# Patient Record
Sex: FEMALE | Race: WHITE | NOT HISPANIC OR LATINO
[De-identification: names, ages, dates, MRNs, and addresses within clinical notes are randomized per-mention and may not be internally consistent; named-entity substitution may affect disease eponyms.]

---

## 2017-02-27 ENCOUNTER — APPOINTMENT (OUTPATIENT)
Dept: ELECTROPHYSIOLOGY | Facility: CLINIC | Age: 82
End: 2017-02-27

## 2017-05-30 ENCOUNTER — APPOINTMENT (OUTPATIENT)
Dept: ELECTROPHYSIOLOGY | Facility: CLINIC | Age: 82
End: 2017-05-30

## 2017-09-12 ENCOUNTER — APPOINTMENT (OUTPATIENT)
Dept: ELECTROPHYSIOLOGY | Facility: CLINIC | Age: 82
End: 2017-09-12

## 2017-12-14 ENCOUNTER — APPOINTMENT (OUTPATIENT)
Dept: ELECTROPHYSIOLOGY | Facility: CLINIC | Age: 82
End: 2017-12-14
Payer: MEDICARE

## 2017-12-14 PROCEDURE — 93280 PM DEVICE PROGR EVAL DUAL: CPT

## 2018-03-19 ENCOUNTER — APPOINTMENT (OUTPATIENT)
Dept: ELECTROPHYSIOLOGY | Facility: CLINIC | Age: 83
End: 2018-03-19

## 2018-07-05 ENCOUNTER — APPOINTMENT (OUTPATIENT)
Dept: ELECTROPHYSIOLOGY | Facility: CLINIC | Age: 83
End: 2018-07-05
Payer: MEDICARE

## 2018-07-05 PROCEDURE — 93280 PM DEVICE PROGR EVAL DUAL: CPT

## 2018-10-16 ENCOUNTER — APPOINTMENT (OUTPATIENT)
Dept: ELECTROPHYSIOLOGY | Facility: CLINIC | Age: 83
End: 2018-10-16

## 2019-01-17 ENCOUNTER — APPOINTMENT (OUTPATIENT)
Dept: ELECTROPHYSIOLOGY | Facility: CLINIC | Age: 84
End: 2019-01-17
Payer: MEDICARE

## 2019-01-17 PROCEDURE — 93280 PM DEVICE PROGR EVAL DUAL: CPT

## 2019-01-17 RX ORDER — NIFEDIPINE 30 MG/1
30 TABLET, FILM COATED, EXTENDED RELEASE ORAL DAILY
Refills: 0 | Status: ACTIVE | COMMUNITY

## 2019-06-17 ENCOUNTER — APPOINTMENT (OUTPATIENT)
Dept: ELECTROPHYSIOLOGY | Facility: CLINIC | Age: 84
End: 2019-06-17
Payer: MEDICARE

## 2019-06-17 VITALS — SYSTOLIC BLOOD PRESSURE: 119 MMHG | HEART RATE: 70 BPM | DIASTOLIC BLOOD PRESSURE: 77 MMHG | RESPIRATION RATE: 14 BRPM

## 2019-06-17 PROCEDURE — 93280 PM DEVICE PROGR EVAL DUAL: CPT

## 2019-09-16 ENCOUNTER — APPOINTMENT (OUTPATIENT)
Dept: ELECTROPHYSIOLOGY | Facility: CLINIC | Age: 84
End: 2019-09-16
Payer: MEDICARE

## 2019-09-16 VITALS — SYSTOLIC BLOOD PRESSURE: 99 MMHG | DIASTOLIC BLOOD PRESSURE: 68 MMHG | HEART RATE: 60 BPM | RESPIRATION RATE: 14 BRPM

## 2019-09-16 PROCEDURE — 93280 PM DEVICE PROGR EVAL DUAL: CPT

## 2019-11-18 ENCOUNTER — APPOINTMENT (OUTPATIENT)
Dept: ELECTROPHYSIOLOGY | Facility: CLINIC | Age: 84
End: 2019-11-18
Payer: MEDICARE

## 2019-11-18 PROCEDURE — 93294 REM INTERROG EVL PM/LDLS PM: CPT

## 2019-11-18 PROCEDURE — 93296 REM INTERROG EVL PM/IDS: CPT

## 2020-01-27 ENCOUNTER — APPOINTMENT (OUTPATIENT)
Dept: ELECTROPHYSIOLOGY | Facility: CLINIC | Age: 85
End: 2020-01-27
Payer: MEDICARE

## 2020-01-27 PROCEDURE — 93280 PM DEVICE PROGR EVAL DUAL: CPT

## 2020-04-27 ENCOUNTER — APPOINTMENT (OUTPATIENT)
Dept: ELECTROPHYSIOLOGY | Facility: CLINIC | Age: 85
End: 2020-04-27

## 2020-04-30 ENCOUNTER — APPOINTMENT (OUTPATIENT)
Dept: ELECTROPHYSIOLOGY | Facility: CLINIC | Age: 85
End: 2020-04-30
Payer: MEDICARE

## 2020-04-30 PROCEDURE — 93294 REM INTERROG EVL PM/LDLS PM: CPT

## 2020-04-30 PROCEDURE — 93296 REM INTERROG EVL PM/IDS: CPT

## 2020-07-31 ENCOUNTER — APPOINTMENT (OUTPATIENT)
Dept: ELECTROPHYSIOLOGY | Facility: CLINIC | Age: 85
End: 2020-07-31
Payer: MEDICARE

## 2020-07-31 PROCEDURE — 93294 REM INTERROG EVL PM/LDLS PM: CPT

## 2020-07-31 PROCEDURE — 93296 REM INTERROG EVL PM/IDS: CPT

## 2020-09-30 ENCOUNTER — APPOINTMENT (OUTPATIENT)
Dept: ELECTROPHYSIOLOGY | Facility: CLINIC | Age: 85
End: 2020-09-30
Payer: MEDICARE

## 2020-09-30 VITALS — SYSTOLIC BLOOD PRESSURE: 114 MMHG | DIASTOLIC BLOOD PRESSURE: 68 MMHG

## 2020-09-30 DIAGNOSIS — I44.2 ATRIOVENTRICULAR BLOCK, COMPLETE: ICD-10-CM

## 2020-09-30 PROCEDURE — 93280 PM DEVICE PROGR EVAL DUAL: CPT

## 2020-11-19 LAB
INR PPP: 1.5
PROTHROMBIN TIME COMMENT: NORMAL

## 2020-12-22 DIAGNOSIS — Z20.828 CONTACT WITH AND (SUSPECTED) EXPOSURE TO OTHER VIRAL COMMUNICABLE DISEASES: ICD-10-CM

## 2020-12-28 LAB
INR PPP: 3.92
PROTHROMBIN TIME COMMENT: NORMAL
PT BLD: 43.1

## 2020-12-30 ENCOUNTER — APPOINTMENT (OUTPATIENT)
Dept: ELECTROPHYSIOLOGY | Facility: CLINIC | Age: 85
End: 2020-12-30
Payer: MEDICARE

## 2020-12-30 PROCEDURE — 93296 REM INTERROG EVL PM/IDS: CPT

## 2020-12-30 PROCEDURE — 93294 REM INTERROG EVL PM/LDLS PM: CPT

## 2021-01-01 ENCOUNTER — APPOINTMENT (OUTPATIENT)
Dept: ELECTROPHYSIOLOGY | Facility: CLINIC | Age: 86
End: 2021-01-01
Payer: MEDICARE

## 2021-01-01 ENCOUNTER — NON-APPOINTMENT (OUTPATIENT)
Age: 86
End: 2021-01-01

## 2021-01-01 LAB
INR PPP: 1.75
INR PPP: 2.72
INR PPP: 3.08
INR PPP: 3.41
INR PPP: 9.48
PROTHROMBIN TIME COMMENT: NORMAL
PT BLD: 101.8
PT BLD: 20.1
PT BLD: 30.6
PT BLD: 34.4
PT BLD: 38

## 2021-01-01 PROCEDURE — 93296 REM INTERROG EVL PM/IDS: CPT

## 2021-01-01 PROCEDURE — 93294 REM INTERROG EVL PM/LDLS PM: CPT | Mod: NC

## 2021-01-12 LAB
INR PPP: 3.59
PROTHROMBIN TIME COMMENT: NORMAL
PT BLD: 41.1

## 2021-01-14 LAB
INR PPP: 2.91
INR PPP: 3.03
PROTHROMBIN TIME COMMENT: NORMAL
PROTHROMBIN TIME COMMENT: NORMAL
PT BLD: 32.2
PT BLD: 34.6

## 2021-01-22 ENCOUNTER — APPOINTMENT (OUTPATIENT)
Dept: ELECTROPHYSIOLOGY | Facility: CLINIC | Age: 86
End: 2021-01-22
Payer: MEDICARE

## 2021-01-22 ENCOUNTER — NON-APPOINTMENT (OUTPATIENT)
Age: 86
End: 2021-01-22

## 2021-01-22 VITALS
HEART RATE: 66 BPM | OXYGEN SATURATION: 98 % | SYSTOLIC BLOOD PRESSURE: 157 MMHG | DIASTOLIC BLOOD PRESSURE: 92 MMHG | BODY MASS INDEX: 22.82 KG/M2 | HEIGHT: 59 IN

## 2021-01-22 VITALS — WEIGHT: 113 LBS | BODY MASS INDEX: 22.82 KG/M2

## 2021-01-22 VITALS — TEMPERATURE: 96.4 F

## 2021-01-22 LAB
INR PPP: 2.93
PROTHROMBIN TIME COMMENT: NORMAL
PT BLD: 33.5

## 2021-01-22 PROCEDURE — 99215 OFFICE O/P EST HI 40 MIN: CPT

## 2021-01-22 PROCEDURE — 99072 ADDL SUPL MATRL&STAF TM PHE: CPT

## 2021-01-22 PROCEDURE — 93000 ELECTROCARDIOGRAM COMPLETE: CPT

## 2021-01-22 NOTE — PHYSICAL EXAM
[General Appearance - Well Developed] : well developed [Normal Appearance] : normal appearance [Well Groomed] : well groomed [General Appearance - Well Nourished] : well nourished [No Deformities] : no deformities [General Appearance - In No Acute Distress] : no acute distress [Normal Conjunctiva] : the conjunctiva exhibited no abnormalities [Eyelids - No Xanthelasma] : the eyelids demonstrated no xanthelasmas [Normal Oral Mucosa] : normal oral mucosa [No Oral Pallor] : no oral pallor [No Oral Cyanosis] : no oral cyanosis [Normal Jugular Venous A Waves Present] : normal jugular venous A waves present [Normal Jugular Venous V Waves Present] : normal jugular venous V waves present [No Jugular Venous Van A Waves] : no jugular venous van A waves [Heart Rate And Rhythm] : heart rate and rhythm were normal [Heart Sounds] : normal S1 and S2 [Murmurs] : no murmurs present [Respiration, Rhythm And Depth] : normal respiratory rhythm and effort [Exaggerated Use Of Accessory Muscles For Inspiration] : no accessory muscle use [Auscultation Breath Sounds / Voice Sounds] : lungs were clear to auscultation bilaterally [Abdomen Soft] : soft [Abdomen Tenderness] : non-tender [Abdomen Mass (___ Cm)] : no abdominal mass palpated [Abnormal Walk] : normal gait [Gait - Sufficient For Exercise Testing] : the gait was sufficient for exercise testing [Nail Clubbing] : no clubbing of the fingernails [Cyanosis, Localized] : no localized cyanosis [Petechial Hemorrhages (___cm)] : no petechial hemorrhages [Skin Color & Pigmentation] : normal skin color and pigmentation [] : no rash [No Venous Stasis] : no venous stasis [Skin Lesions] : no skin lesions [No Skin Ulcers] : no skin ulcer [No Xanthoma] : no  xanthoma was observed [Oriented To Time, Place, And Person] : oriented to person, place, and time [Affect] : the affect was normal [Mood] : the mood was normal [No Anxiety] : not feeling anxious

## 2021-01-22 NOTE — HISTORY OF PRESENT ILLNESS
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 Admits doing well with no issues or complaints. Denies chest pain, palpitations, SOB, syncope or near syncope. KELSEY reached as of 12/28/2020.

## 2021-01-22 NOTE — DISCUSSION/SUMMARY
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 \par \par Impression:\par \par 1. Complete AV block: s/p dual chamber PPM. Now at KELSEY. Recommend undergoing routine PPM gen change. Risks, benefits, and alternatives discussed. Of note, ECHO in 2014 with LVEF 41%. Will get more recent ECHO. Feels well, some dyspnea. Risk of upgrade may outweigh benefit at this time. \par \par 2. Paroxysmal afib: resume warfarin for thromboembolic prophylaxis. \par \par 3. HTN: resume oral antihypertensives as prescribed. Encouraged heart healthy diet, sodium restriction, and weight loss. Continue regular f/u with Cardiologist for further HTN management.\par \par 3. HLD: resume statin therapy as prescribed and regular f/u with Cardiologist for routine lipid monitoring and management.\par \par Sincerely,\par \par Marcellus Donovan MD

## 2021-01-28 LAB
INR PPP: 3.39
PROTHROMBIN TIME COMMENT: NORMAL
PT BLD: 38.8

## 2021-02-05 LAB
INR PPP: 1.99
PROTHROMBIN TIME COMMENT: NORMAL
PT BLD: 22.6

## 2021-02-11 LAB
INR PPP: 2.06
PROTHROMBIN TIME COMMENT: NORMAL

## 2021-02-16 ENCOUNTER — OUTPATIENT (OUTPATIENT)
Dept: OUTPATIENT SERVICES | Facility: HOSPITAL | Age: 86
LOS: 1 days | End: 2021-02-16

## 2021-02-16 VITALS
RESPIRATION RATE: 16 BRPM | TEMPERATURE: 97 F | HEIGHT: 61 IN | DIASTOLIC BLOOD PRESSURE: 72 MMHG | SYSTOLIC BLOOD PRESSURE: 120 MMHG | HEART RATE: 67 BPM | WEIGHT: 117.07 LBS | OXYGEN SATURATION: 98 %

## 2021-02-16 DIAGNOSIS — R52 PAIN, UNSPECIFIED: ICD-10-CM

## 2021-02-16 DIAGNOSIS — I44.2 ATRIOVENTRICULAR BLOCK, COMPLETE: ICD-10-CM

## 2021-02-16 DIAGNOSIS — Z98.890 OTHER SPECIFIED POSTPROCEDURAL STATES: Chronic | ICD-10-CM

## 2021-02-16 LAB
ALBUMIN SERPL ELPH-MCNC: 4.3 G/DL — SIGNIFICANT CHANGE UP (ref 3.3–5)
ALP SERPL-CCNC: 89 U/L — SIGNIFICANT CHANGE UP (ref 40–120)
ALT FLD-CCNC: 15 U/L — SIGNIFICANT CHANGE UP (ref 4–33)
ANION GAP SERPL CALC-SCNC: 12 MMOL/L — SIGNIFICANT CHANGE UP (ref 7–14)
APTT BLD: 43.2 SEC — HIGH (ref 27–36.3)
AST SERPL-CCNC: 22 U/L — SIGNIFICANT CHANGE UP (ref 4–32)
BILIRUB SERPL-MCNC: 0.7 MG/DL — SIGNIFICANT CHANGE UP (ref 0.2–1.2)
BUN SERPL-MCNC: 40 MG/DL — HIGH (ref 7–23)
CALCIUM SERPL-MCNC: 9.8 MG/DL — SIGNIFICANT CHANGE UP (ref 8.4–10.5)
CHLORIDE SERPL-SCNC: 100 MMOL/L — SIGNIFICANT CHANGE UP (ref 98–107)
CO2 SERPL-SCNC: 23 MMOL/L — SIGNIFICANT CHANGE UP (ref 22–31)
CREAT SERPL-MCNC: 1.51 MG/DL — HIGH (ref 0.5–1.3)
GLUCOSE SERPL-MCNC: 75 MG/DL — SIGNIFICANT CHANGE UP (ref 70–99)
HCT VFR BLD CALC: 37.1 % — SIGNIFICANT CHANGE UP (ref 34.5–45)
HGB BLD-MCNC: 11.6 G/DL — SIGNIFICANT CHANGE UP (ref 11.5–15.5)
INR BLD: 2.63 RATIO — HIGH (ref 0.88–1.16)
MCHC RBC-ENTMCNC: 31.3 GM/DL — LOW (ref 32–36)
MCHC RBC-ENTMCNC: 32.1 PG — SIGNIFICANT CHANGE UP (ref 27–34)
MCV RBC AUTO: 102.8 FL — HIGH (ref 80–100)
NRBC # BLD: 0 /100 WBCS — SIGNIFICANT CHANGE UP
NRBC # FLD: 0 K/UL — SIGNIFICANT CHANGE UP
PLATELET # BLD AUTO: 218 K/UL — SIGNIFICANT CHANGE UP (ref 150–400)
POTASSIUM SERPL-MCNC: 4.1 MMOL/L — SIGNIFICANT CHANGE UP (ref 3.5–5.3)
POTASSIUM SERPL-SCNC: 4.1 MMOL/L — SIGNIFICANT CHANGE UP (ref 3.5–5.3)
PROT SERPL-MCNC: 7.6 G/DL — SIGNIFICANT CHANGE UP (ref 6–8.3)
PROTHROM AB SERPL-ACNC: 28.6 SEC — HIGH (ref 10.6–13.6)
RBC # BLD: 3.61 M/UL — LOW (ref 3.8–5.2)
RBC # FLD: 13.4 % — SIGNIFICANT CHANGE UP (ref 10.3–14.5)
SODIUM SERPL-SCNC: 135 MMOL/L — SIGNIFICANT CHANGE UP (ref 135–145)
WBC # BLD: 7.09 K/UL — SIGNIFICANT CHANGE UP (ref 3.8–10.5)
WBC # FLD AUTO: 7.09 K/UL — SIGNIFICANT CHANGE UP (ref 3.8–10.5)

## 2021-02-16 NOTE — H&P PST ADULT - CVS HE PE MLT D E PC
note: has h/o atrial fibrillation; S 1 S 2/regular rate and rhythm/no rub/no murmur note: has h/o atrial fibrillation; S 1 S 2/regular rate and rhythm/no rub

## 2021-02-16 NOTE — H&P PST ADULT - NSANTHOSAYNRD_GEN_A_CORE
No. MANJINDER screening performed.  STOP BANG Legend: 0-2 = LOW Risk; 3-4 = INTERMEDIATE Risk; 5-8 = HIGH Risk

## 2021-02-16 NOTE — H&P PST ADULT - NSICDXPASTSURGICALHX_GEN_ALL_CORE_FT
PAST SURGICAL HISTORY:  Artificial pacemaker to be changed on 2-24-21, medtronic [id number] Adapta    S/P ovarian cystectomy h/o  bilateral  oophorectomy

## 2021-02-16 NOTE — H&P PST ADULT - NSICDXPASTMEDICALHX_GEN_ALL_CORE_FT
PAST MEDICAL HISTORY:  Atrial fibrillation     Atrial fibrillation on coumadin    H/O CHF     H/O hyperlipidemia     H/O: hypertension     Hearing loss bilateral, wears hearing aids bilaterall    History of complete AV block has pacemaker medtronic [id number] Adapta -- to be changed on 2-24-21     PAST MEDICAL HISTORY:  Aortic stenosis     Atrial fibrillation on coumadin    H/O CHF     H/O hyperlipidemia     H/O: hypertension     Hearing loss bilateral, wears hearing aids bilaterall    History of complete AV block has pacemaker medtronic [id number] Adapta -- to be changed on 2-24-21    Murmur

## 2021-02-16 NOTE — H&P PST ADULT - NSICDXPROBLEM_GEN_ALL_CORE_FT
PROBLEM DIAGNOSES  Problem: Pain  Assessment and Plan: This is a 89 y/o female who is scheduled for permanent pacemaker generator change on 2-24-21  * Scheduled for covid testing  * Given preop and cleasner instructions with good teach back and patient verbalized understanding  * Copy of ekg and pacemaker interrogation in chart  * Instructed by MD not to take coumadin the evening before or morning of surgery  * Instructed to take normal am dose of    the am of surgery       PROBLEM DIAGNOSES  Problem: Pain  Assessment and Plan: This is a 89 y/o female who is scheduled for permanent pacemaker generator change on 2-24-21  * Scheduled for covid testing  * Given preop instructions -- not given cleanser because she did not relay back to NP on how to use cleanser  * Copy of ekg and pacemaker interrogation in chart  * Instructed by MD not to take coumadin the evening before or morning of surgery  * Instructed to take normal am dose of    the am of surgery       PROBLEM DIAGNOSES  Problem: Pain  Assessment and Plan: This is a 89 y/o female who is scheduled for permanent pacemaker generator change on 2-24-21  * Scheduled for covid testing  * Given preop instructions -- not given cleanser because she did not relay back to NP on how to use cleanser  * Copy of ekg and pacemaker interrogation in chart  * Instructed by MD not to take coumadin the evening before or morning of surgery  * Instructed to take normal am dose of  nifedipine and metoprolol  the am of surgery       PROBLEM DIAGNOSES  Problem: Pain  Assessment and Plan: This is a 89 y/o female who is scheduled for permanent pacemaker generator change on 2-24-21  * Scheduled for covid testing  * Given preop instructions -- not given cleanser because she did not relay back to NP on how to use cleanser  * Copy of ekg and pacemaker interrogation in chart  * Await stress test and echo from Dr. DIANNE Mujica  * Instructed by MD not to take coumadin the evening before or morning of surgery  * Instructed to take normal am dose of  nifedipine and metoprolol  the am of surgery  * Notified OR booking via fax of PPM

## 2021-02-16 NOTE — H&P PST ADULT - HISTORY OF PRESENT ILLNESS
This is a 89 y/o female who presents with dual chamber PPM which has reached KELSEY. Asymptomatic. Scheduled for permanent pacemaker generator change This is a 89 y/o female who presents with dual chamber PPM which has reached KELSEY (elective replacement indication). Asymptomatic. Scheduled for permanent pacemaker generator change

## 2021-02-17 PROBLEM — Z86.79 PERSONAL HISTORY OF OTHER DISEASES OF THE CIRCULATORY SYSTEM: Chronic | Status: ACTIVE | Noted: 2021-02-16

## 2021-02-17 PROBLEM — H91.90 UNSPECIFIED HEARING LOSS, UNSPECIFIED EAR: Chronic | Status: ACTIVE | Noted: 2021-02-16

## 2021-02-17 PROBLEM — I48.91 UNSPECIFIED ATRIAL FIBRILLATION: Chronic | Status: ACTIVE | Noted: 2021-02-16

## 2021-02-17 PROBLEM — I35.0 NONRHEUMATIC AORTIC (VALVE) STENOSIS: Chronic | Status: ACTIVE | Noted: 2021-02-16

## 2021-02-17 PROBLEM — R01.1 CARDIAC MURMUR, UNSPECIFIED: Chronic | Status: ACTIVE | Noted: 2021-02-16

## 2021-02-19 LAB
INR PPP: 2.71
PROTHROMBIN TIME COMMENT: NORMAL

## 2021-02-20 DIAGNOSIS — Z01.818 ENCOUNTER FOR OTHER PREPROCEDURAL EXAMINATION: ICD-10-CM

## 2021-02-21 ENCOUNTER — APPOINTMENT (OUTPATIENT)
Dept: DISASTER EMERGENCY | Facility: CLINIC | Age: 86
End: 2021-02-21

## 2021-02-22 LAB — SARS-COV-2 N GENE NPH QL NAA+PROBE: NOT DETECTED

## 2021-02-24 ENCOUNTER — OUTPATIENT (OUTPATIENT)
Dept: OUTPATIENT SERVICES | Facility: HOSPITAL | Age: 86
LOS: 1 days | Discharge: HOME | End: 2021-02-24
Payer: MEDICARE

## 2021-02-24 DIAGNOSIS — I44.2 ATRIOVENTRICULAR BLOCK, COMPLETE: ICD-10-CM

## 2021-02-24 DIAGNOSIS — Z98.890 OTHER SPECIFIED POSTPROCEDURAL STATES: Chronic | ICD-10-CM

## 2021-02-24 LAB
INR BLD: 3.04 RATIO — HIGH (ref 0.88–1.16)
PROTHROM AB SERPL-ACNC: 32.9 SEC — HIGH (ref 10.6–13.6)

## 2021-02-24 PROCEDURE — 33263 RMVL & RPLCMT DFB GEN 2 LEAD: CPT

## 2021-02-24 PROCEDURE — 93010 ELECTROCARDIOGRAM REPORT: CPT

## 2021-02-24 PROCEDURE — 93010 ELECTROCARDIOGRAM REPORT: CPT | Mod: 77

## 2021-02-24 RX ORDER — ERGOCALCIFEROL 1.25 MG/1
1 CAPSULE ORAL
Qty: 0 | Refills: 0 | DISCHARGE

## 2021-02-24 RX ORDER — WARFARIN SODIUM 2.5 MG/1
0 TABLET ORAL
Qty: 0 | Refills: 0 | DISCHARGE

## 2021-02-24 RX ORDER — SODIUM CHLORIDE 9 MG/ML
3 INJECTION INTRAMUSCULAR; INTRAVENOUS; SUBCUTANEOUS EVERY 8 HOURS
Refills: 0 | Status: DISCONTINUED | OUTPATIENT
Start: 2021-02-24 | End: 2021-03-11

## 2021-02-24 RX ORDER — WARFARIN SODIUM 2.5 MG/1
1 TABLET ORAL
Qty: 0 | Refills: 0 | DISCHARGE

## 2021-02-24 NOTE — CHART NOTE - NSCHARTNOTEFT_GEN_A_CORE
Type of Procedure: pacemaker generator change (dual chamber)  Licensed independent practitioner: Marcellus Donovan MD  Assistant: none  Description of procedure: sterile conditions, antibiotic coverage, old pacemaker removed, pocket copiously irrigated with antibiotic solution, new pacemaker implanted and pocket closed in 3 layers. A representative was present to help operate a sophisticated .   Findings of procedure: normal pacing, sensing and lead integrity  Estimated blood loss: < 10 cc  Specimen removed: old battery depleted pacemaker  Preoperative Dx: complete heart block; pacemaker at EOL  Postoperative Dx: complete heart block; pacemaker at EOL; pacemaker replaced  Complications: none  Anesthesia type: local anesthesia with sedation  No heparin for 24 hours and then reassess.    Marcellus Donovan MD.

## 2021-02-24 NOTE — CHART NOTE - NSCHARTNOTEFT_GEN_A_CORE
Patient is s/p PPM generator change. Device site clean, dry and intact with no bleeding or hematoma. Device teaching given to patient and she demonstrates understanding of the instructions. All questions answered. F/U appointment with device clinic on 3/12/21 @ 10:15 am

## 2021-02-24 NOTE — CHART NOTE - NSCHARTNOTEFT_GEN_A_CORE
In brief this is a 89 y/o F with PMH of PAF(on Coumadin and last dose was 2 days ago), HTN, HLD, Complete AVB s/p PPM implant presented to [facility] for PPM generator change as it is KELSEY. Patient stated that she has been in her usual state of health since her last visit from Dr. Donovan's office. Explained in detail about the procedure, all risks and benefits explained and patient understood and signed the consents for the procedure. Medication list reviewed with patient and updated, STAT INR ordered and [employer] H&P and Pre-surgical H&P noted. All questions answered to patient's satisfaction. EKG revealed Electronic V-paced rhythm at a rate of 65 with QTc of 480.     COVID PCR not detected on 02/21/2021

## 2021-02-24 NOTE — CHART NOTE - NSCHARTNOTEFT_GEN_A_CORE
Spoke with Dr. Mujica patient's primary cardiologist regarding Coumadin dosing post PPM generator change. As per Dr. Mujica can give Coumadin 2.5mg for two consecutive days followed by 5mg the 3rd day. Educated patient that she must take 2.5mg for two consecutive days and then take 5mg the 3rd day. Spoke with Dr. Mujica patient's primary cardiologist regarding Coumadin dosing post PPM generator change. As per Dr. Mujica can give Coumadin 2.5mg for two consecutive days followed by 5mg the 3rd day and then repeat this process. Educated patient that she must take 2.5mg for two consecutive days and then take 5mg the 3rd day. Instructed patient that she must follow up with Dr. Mujica either end of this week or earlier next week for INR checks.

## 2021-03-01 LAB
INR PPP: 2.63
PROTHROMBIN TIME COMMENT: NORMAL

## 2021-03-02 ENCOUNTER — RX RENEWAL (OUTPATIENT)
Age: 86
End: 2021-03-02

## 2021-03-02 DIAGNOSIS — Z79.01 LONG TERM (CURRENT) USE OF ANTICOAGULANTS: ICD-10-CM

## 2021-03-02 DIAGNOSIS — I10 ESSENTIAL (PRIMARY) HYPERTENSION: ICD-10-CM

## 2021-03-02 DIAGNOSIS — I35.0 NONRHEUMATIC AORTIC (VALVE) STENOSIS: ICD-10-CM

## 2021-03-12 ENCOUNTER — APPOINTMENT (OUTPATIENT)
Dept: ELECTROPHYSIOLOGY | Facility: CLINIC | Age: 86
End: 2021-03-12
Payer: MEDICARE

## 2021-03-12 VITALS — DIASTOLIC BLOOD PRESSURE: 74 MMHG | SYSTOLIC BLOOD PRESSURE: 120 MMHG

## 2021-03-12 PROCEDURE — 99072 ADDL SUPL MATRL&STAF TM PHE: CPT

## 2021-03-12 PROCEDURE — 93280 PM DEVICE PROGR EVAL DUAL: CPT

## 2021-03-18 ENCOUNTER — APPOINTMENT (OUTPATIENT)
Dept: CARDIOLOGY | Facility: CLINIC | Age: 86
End: 2021-03-18
Payer: MEDICARE

## 2021-03-18 VITALS
OXYGEN SATURATION: 96 % | DIASTOLIC BLOOD PRESSURE: 70 MMHG | WEIGHT: 107 LBS | BODY MASS INDEX: 21.61 KG/M2 | SYSTOLIC BLOOD PRESSURE: 112 MMHG | HEART RATE: 80 BPM

## 2021-03-18 DIAGNOSIS — I50.9 HEART FAILURE, UNSPECIFIED: ICD-10-CM

## 2021-03-18 DIAGNOSIS — E78.5 HYPERLIPIDEMIA, UNSPECIFIED: ICD-10-CM

## 2021-03-18 DIAGNOSIS — Z79.01 ENCOUNTER FOR THERAPEUTIC DRUG LVL MONITORING: ICD-10-CM

## 2021-03-18 DIAGNOSIS — Z51.81 ENCOUNTER FOR THERAPEUTIC DRUG LVL MONITORING: ICD-10-CM

## 2021-03-18 DIAGNOSIS — I44.2 ATRIOVENTRICULAR BLOCK, COMPLETE: ICD-10-CM

## 2021-03-18 DIAGNOSIS — Z95.0 PRESENCE OF CARDIAC PACEMAKER: ICD-10-CM

## 2021-03-18 DIAGNOSIS — I35.0 NONRHEUMATIC AORTIC (VALVE) STENOSIS: ICD-10-CM

## 2021-03-18 DIAGNOSIS — I48.0 PAROXYSMAL ATRIAL FIBRILLATION: ICD-10-CM

## 2021-03-18 PROCEDURE — 99072 ADDL SUPL MATRL&STAF TM PHE: CPT

## 2021-03-18 PROCEDURE — 99204 OFFICE O/P NEW MOD 45 MIN: CPT

## 2021-03-18 NOTE — HISTORY OF PRESENT ILLNESS
[FreeTextEntry1] : Zarina Martines is an amazing 89-year-old woman who I have seen in the past and who has had several cardiac catheterizations\par \par She is 89 years old with a history of hypertension, chronic atrial fibrillation, mixed hyperlipidemia, status post pacemaker for AV block many years ago, status post cardiac catheterization on at least 2 occasions many years ago with normal coronary arteries and moderate aortic stenosis.  Subsequent echocardiogram has revealed significant progression of the aortic stenosis but LV function apparently has been normal.  She has not had a repeat noninvasive work-up\par \par Overall she has been stable with mild exertional shortness of breath.  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  There is no exertional chest pressure no exertional dizziness but she herself realizes that she is slowing down with shortness of breath.  There is no edema orthopnea PND\par \par Her INRs have been in range between 2 and 2.5 sometimes a little bit higher and now is followed once a month with home draws.  She is on Coumadin 2.5, 2.5, 5, Nifedical and metoprolol 25 twice daily\par \par She denies headache palpitations dizziness or syncope

## 2021-03-18 NOTE — PHYSICAL EXAM
[Normal Appearance] : normal appearance [General Appearance - Well Nourished] : well nourished [Normal Conjunctiva] : the conjunctiva exhibited no abnormalities [No Jugular Venous Van A Waves] : no jugular venous van A waves [Heart Sounds] : normal S1 and S2 [Auscultation Breath Sounds / Voice Sounds] : lungs were clear to auscultation bilaterally [Abdomen Soft] : soft [Abdomen Tenderness] : non-tender [Cyanosis, Localized] : no localized cyanosis [FreeTextEntry1] : No edema

## 2021-03-18 NOTE — ASSESSMENT
[FreeTextEntry1] : Zarina Martines an amazing 89-year-old with known aortic stenosis moderate to severe to severe, chronic atrial fibrillation on anticoagulation, status post pacemaker for AV block presently paced 100% of the time with underlying atrial fibrillation and recently some progressive exertional fatigue and shortness of breath.  She is quite active and when she is overly active the symptoms have now progressed.  She presently is 89 years old and fairly satisfied with her lifestyle and realizes that she is slowing down.\par \par In the past we have had discussions about a more aggressive approach with transaortic valve replacement which she has declined.  She is 89 years old and still functioning at a good level

## 2021-03-18 NOTE — DISCUSSION/SUMMARY
[FreeTextEntry1] : The patient is amazingly stable and for now I advised her to try to decrease the amount of activity that she does in terms of household work and to try to get more help in her house.  She will be moved to an assisted living in the next few months\par \par She should continue on her present regimen of medications\par INR will be drawn once a month and we will inform her as to how to change the dose of the Coumadin\par \par I had a long discussion with the son and told him to be proactive in terms of getting her help in the assisted living facility.  She is quite independent and mentally quite sharp\par \par Follow-up with me again in 3 to 4 months

## 2021-03-18 NOTE — REASON FOR VISIT
[FreeTextEntry1] : Zarina Jvaiersmason 89 years old previously seen by me in the distant past.  Patient has known aortic stenosis, chronic atrial fibrillation, status post pacemaker

## 2021-03-31 ENCOUNTER — APPOINTMENT (OUTPATIENT)
Dept: ELECTROPHYSIOLOGY | Facility: CLINIC | Age: 86
End: 2021-03-31

## 2021-04-22 LAB
INR PPP: 3.1
PROTHROMBIN TIME COMMENT: NORMAL
PT BLD: 35.7

## 2021-05-21 LAB
INR PPP: 1.07
PROTHROMBIN TIME COMMENT: NORMAL
PT BLD: 12.3

## 2021-06-03 ENCOUNTER — RX RENEWAL (OUTPATIENT)
Age: 86
End: 2021-06-03

## 2021-06-04 LAB
INR PPP: 1.1
PROTHROMBIN TIME COMMENT: NORMAL
PT BLD: 12.7

## 2021-06-14 ENCOUNTER — NON-APPOINTMENT (OUTPATIENT)
Age: 86
End: 2021-06-14

## 2021-06-14 ENCOUNTER — APPOINTMENT (OUTPATIENT)
Dept: ELECTROPHYSIOLOGY | Facility: CLINIC | Age: 86
End: 2021-06-14
Payer: MEDICARE

## 2021-06-14 PROCEDURE — 93296 REM INTERROG EVL PM/IDS: CPT

## 2021-06-14 PROCEDURE — 93294 REM INTERROG EVL PM/LDLS PM: CPT

## 2021-06-25 LAB
INR PPP: 1.36
PROTHROMBIN TIME COMMENT: NORMAL
PT BLD: 15.8

## 2021-07-02 LAB
INR PPP: 1.69
PROTHROMBIN TIME COMMENT: NORMAL

## 2021-08-02 LAB
INR PPP: 1.86
PROTHROMBIN TIME COMMENT: NORMAL
PT BLD: 21.5

## 2021-08-16 LAB
INR PPP: 1.59
PROTHROMBIN TIME COMMENT: NORMAL
PT BLD: 18.3

## 2021-09-08 LAB
INR PPP: 1.88
PROTHROMBIN TIME COMMENT: NORMAL
PT BLD: 21.7

## 2021-09-13 ENCOUNTER — NON-APPOINTMENT (OUTPATIENT)
Age: 86
End: 2021-09-13

## 2021-09-13 ENCOUNTER — APPOINTMENT (OUTPATIENT)
Dept: ELECTROPHYSIOLOGY | Facility: CLINIC | Age: 86
End: 2021-09-13
Payer: MEDICARE

## 2021-09-13 PROCEDURE — 93296 REM INTERROG EVL PM/IDS: CPT

## 2021-09-13 PROCEDURE — 93294 REM INTERROG EVL PM/LDLS PM: CPT

## 2021-10-01 ENCOUNTER — APPOINTMENT (OUTPATIENT)
Dept: ELECTROPHYSIOLOGY | Facility: CLINIC | Age: 86
End: 2021-10-01

## 2021-10-05 LAB
INR PPP: 3.24
PROTHROMBIN TIME COMMENT: NORMAL
PT BLD: 36.2

## 2021-10-11 LAB
INR PPP: 1.77
PROTHROMBIN TIME COMMENT: NORMAL
PT BLD: 20.3

## 2021-10-22 LAB
INR PPP: 1.76
PROTHROMBIN TIME COMMENT: NORMAL
PT BLD: 20.4

## 2022-01-01 ENCOUNTER — NON-APPOINTMENT (OUTPATIENT)
Age: 87
End: 2022-01-01

## 2022-01-01 ENCOUNTER — TRANSCRIPTION ENCOUNTER (OUTPATIENT)
Age: 87
End: 2022-01-01

## 2022-01-01 ENCOUNTER — EMERGENCY (EMERGENCY)
Facility: HOSPITAL | Age: 87
LOS: 1 days | Discharge: HOME | End: 2022-01-01
Attending: EMERGENCY MEDICINE
Payer: MEDICARE

## 2022-01-01 ENCOUNTER — INPATIENT (INPATIENT)
Facility: HOSPITAL | Age: 87
LOS: 3 days | Discharge: NURSING, REHAB OR LONG-TERM CARE | DRG: 177 | End: 2022-10-04
Attending: INTERNAL MEDICINE | Admitting: INTERNAL MEDICINE
Payer: MEDICARE

## 2022-01-01 ENCOUNTER — INPATIENT (INPATIENT)
Facility: HOSPITAL | Age: 87
LOS: 0 days | DRG: 291 | End: 2022-11-29
Attending: INTERNAL MEDICINE | Admitting: INTERNAL MEDICINE
Payer: MEDICARE

## 2022-01-01 ENCOUNTER — APPOINTMENT (OUTPATIENT)
Dept: ELECTROPHYSIOLOGY | Facility: CLINIC | Age: 87
End: 2022-01-01
Payer: MEDICARE

## 2022-01-01 ENCOUNTER — RX RENEWAL (OUTPATIENT)
Age: 87
End: 2022-01-01

## 2022-01-01 ENCOUNTER — APPOINTMENT (OUTPATIENT)
Dept: ELECTROPHYSIOLOGY | Facility: CLINIC | Age: 87
End: 2022-01-01

## 2022-01-01 VITALS
DIASTOLIC BLOOD PRESSURE: 79 MMHG | HEIGHT: 61 IN | RESPIRATION RATE: 20 BRPM | SYSTOLIC BLOOD PRESSURE: 127 MMHG | HEART RATE: 68 BPM | TEMPERATURE: 98 F | OXYGEN SATURATION: 95 %

## 2022-01-01 VITALS — HEART RATE: 70 BPM | DIASTOLIC BLOOD PRESSURE: 69 MMHG | SYSTOLIC BLOOD PRESSURE: 105 MMHG

## 2022-01-01 VITALS
DIASTOLIC BLOOD PRESSURE: 83 MMHG | TEMPERATURE: 98 F | SYSTOLIC BLOOD PRESSURE: 119 MMHG | RESPIRATION RATE: 20 BRPM | HEART RATE: 74 BPM | OXYGEN SATURATION: 94 % | WEIGHT: 98.11 LBS | HEIGHT: 61 IN

## 2022-01-01 VITALS
OXYGEN SATURATION: 96 % | RESPIRATION RATE: 18 BRPM | DIASTOLIC BLOOD PRESSURE: 50 MMHG | SYSTOLIC BLOOD PRESSURE: 80 MMHG | TEMPERATURE: 98 F | HEART RATE: 59 BPM

## 2022-01-01 VITALS
TEMPERATURE: 98 F | OXYGEN SATURATION: 97 % | DIASTOLIC BLOOD PRESSURE: 75 MMHG | SYSTOLIC BLOOD PRESSURE: 125 MMHG | HEART RATE: 72 BPM | RESPIRATION RATE: 18 BRPM

## 2022-01-01 VITALS
OXYGEN SATURATION: 99 % | TEMPERATURE: 98 F | SYSTOLIC BLOOD PRESSURE: 130 MMHG | HEART RATE: 60 BPM | RESPIRATION RATE: 19 BRPM | DIASTOLIC BLOOD PRESSURE: 84 MMHG

## 2022-01-01 VITALS — HEIGHT: 59 IN | WEIGHT: 110.01 LBS

## 2022-01-01 DIAGNOSIS — Z98.890 OTHER SPECIFIED POSTPROCEDURAL STATES: Chronic | ICD-10-CM

## 2022-01-01 DIAGNOSIS — I10 ESSENTIAL (PRIMARY) HYPERTENSION: ICD-10-CM

## 2022-01-01 DIAGNOSIS — I48.91 UNSPECIFIED ATRIAL FIBRILLATION: ICD-10-CM

## 2022-01-01 DIAGNOSIS — Z71.89 OTHER SPECIFIED COUNSELING: ICD-10-CM

## 2022-01-01 DIAGNOSIS — R09.02 HYPOXEMIA: ICD-10-CM

## 2022-01-01 DIAGNOSIS — N39.0 URINARY TRACT INFECTION, SITE NOT SPECIFIED: ICD-10-CM

## 2022-01-01 DIAGNOSIS — U07.1 COVID-19: ICD-10-CM

## 2022-01-01 DIAGNOSIS — R82.71 BACTERIURIA: ICD-10-CM

## 2022-01-01 DIAGNOSIS — J90 PLEURAL EFFUSION, NOT ELSEWHERE CLASSIFIED: ICD-10-CM

## 2022-01-01 DIAGNOSIS — I35.0 NONRHEUMATIC AORTIC (VALVE) STENOSIS: ICD-10-CM

## 2022-01-01 DIAGNOSIS — I48.20 CHRONIC ATRIAL FIBRILLATION, UNSPECIFIED: ICD-10-CM

## 2022-01-01 DIAGNOSIS — Z51.5 ENCOUNTER FOR PALLIATIVE CARE: ICD-10-CM

## 2022-01-01 LAB
-  AMIKACIN: SIGNIFICANT CHANGE UP
-  AMOXICILLIN/CLAVULANIC ACID: SIGNIFICANT CHANGE UP
-  AMPICILLIN/SULBACTAM: SIGNIFICANT CHANGE UP
-  AMPICILLIN: SIGNIFICANT CHANGE UP
-  AZTREONAM: SIGNIFICANT CHANGE UP
-  CEFAZOLIN: SIGNIFICANT CHANGE UP
-  CEFEPIME: SIGNIFICANT CHANGE UP
-  CEFOXITIN: SIGNIFICANT CHANGE UP
-  CEFTRIAXONE: SIGNIFICANT CHANGE UP
-  CIPROFLOXACIN: SIGNIFICANT CHANGE UP
-  ERTAPENEM: SIGNIFICANT CHANGE UP
-  GENTAMICIN: SIGNIFICANT CHANGE UP
-  IMIPENEM: SIGNIFICANT CHANGE UP
-  LEVOFLOXACIN: SIGNIFICANT CHANGE UP
-  MEROPENEM: SIGNIFICANT CHANGE UP
-  NITROFURANTOIN: SIGNIFICANT CHANGE UP
-  PIPERACILLIN/TAZOBACTAM: SIGNIFICANT CHANGE UP
-  TIGECYCLINE: SIGNIFICANT CHANGE UP
-  TOBRAMYCIN: SIGNIFICANT CHANGE UP
-  TRIMETHOPRIM/SULFAMETHOXAZOLE: SIGNIFICANT CHANGE UP
ALBUMIN SERPL ELPH-MCNC: 3.3 G/DL — SIGNIFICANT CHANGE UP (ref 3.3–5)
ALBUMIN SERPL ELPH-MCNC: 3.4 G/DL — SIGNIFICANT CHANGE UP (ref 3.3–5)
ALBUMIN SERPL ELPH-MCNC: 3.6 G/DL — SIGNIFICANT CHANGE UP (ref 3.3–5)
ALBUMIN SERPL ELPH-MCNC: 3.7 G/DL — SIGNIFICANT CHANGE UP (ref 3.3–5)
ALBUMIN SERPL ELPH-MCNC: 3.9 G/DL — SIGNIFICANT CHANGE UP (ref 3.3–5)
ALBUMIN SERPL ELPH-MCNC: 4 G/DL — SIGNIFICANT CHANGE UP (ref 3.3–5)
ALP SERPL-CCNC: 111 U/L — SIGNIFICANT CHANGE UP (ref 40–120)
ALP SERPL-CCNC: 81 U/L — SIGNIFICANT CHANGE UP (ref 40–120)
ALP SERPL-CCNC: 84 U/L — SIGNIFICANT CHANGE UP (ref 40–120)
ALP SERPL-CCNC: 84 U/L — SIGNIFICANT CHANGE UP (ref 40–120)
ALP SERPL-CCNC: 85 U/L — SIGNIFICANT CHANGE UP (ref 40–120)
ALP SERPL-CCNC: 92 U/L — SIGNIFICANT CHANGE UP (ref 40–120)
ALT FLD-CCNC: 14 U/L — SIGNIFICANT CHANGE UP (ref 10–45)
ALT FLD-CCNC: 15 U/L — SIGNIFICANT CHANGE UP (ref 10–45)
ALT FLD-CCNC: 15 U/L — SIGNIFICANT CHANGE UP (ref 10–45)
ALT FLD-CCNC: 17 U/L — SIGNIFICANT CHANGE UP (ref 10–45)
ALT FLD-CCNC: 17 U/L — SIGNIFICANT CHANGE UP (ref 10–45)
ALT FLD-CCNC: 37 U/L — SIGNIFICANT CHANGE UP (ref 10–45)
ANION GAP SERPL CALC-SCNC: 11 MMOL/L — SIGNIFICANT CHANGE UP (ref 5–17)
ANION GAP SERPL CALC-SCNC: 12 MMOL/L — SIGNIFICANT CHANGE UP (ref 5–17)
ANISOCYTOSIS BLD QL: SIGNIFICANT CHANGE UP
APPEARANCE UR: CLEAR — SIGNIFICANT CHANGE UP
APTT BLD: 38.2 SEC — HIGH (ref 27.5–35.5)
APTT BLD: 40.4 SEC — HIGH (ref 27.5–35.5)
APTT BLD: 40.6 SEC — HIGH (ref 27.5–35.5)
APTT BLD: 51.8 SEC — HIGH (ref 27.5–35.5)
AST SERPL-CCNC: 19 U/L — SIGNIFICANT CHANGE UP (ref 10–40)
AST SERPL-CCNC: 19 U/L — SIGNIFICANT CHANGE UP (ref 10–40)
AST SERPL-CCNC: 20 U/L — SIGNIFICANT CHANGE UP (ref 10–40)
AST SERPL-CCNC: 21 U/L — SIGNIFICANT CHANGE UP (ref 10–40)
AST SERPL-CCNC: 36 U/L — SIGNIFICANT CHANGE UP (ref 10–40)
AST SERPL-CCNC: 62 U/L — HIGH (ref 10–40)
BACTERIA # UR AUTO: ABNORMAL
BASE EXCESS BLDV CALC-SCNC: -8.6 MMOL/L — LOW (ref -2–3)
BASOPHILS # BLD AUTO: 0 K/UL — SIGNIFICANT CHANGE UP (ref 0–0.2)
BASOPHILS # BLD AUTO: 0.02 K/UL — SIGNIFICANT CHANGE UP (ref 0–0.2)
BASOPHILS NFR BLD AUTO: 0 % — SIGNIFICANT CHANGE UP (ref 0–2)
BASOPHILS NFR BLD AUTO: 0.4 % — SIGNIFICANT CHANGE UP (ref 0–2)
BILIRUB DIRECT SERPL-MCNC: 0.2 MG/DL — SIGNIFICANT CHANGE UP (ref 0–0.3)
BILIRUB INDIRECT FLD-MCNC: 0.3 MG/DL — SIGNIFICANT CHANGE UP (ref 0.2–1)
BILIRUB SERPL-MCNC: 0.4 MG/DL — SIGNIFICANT CHANGE UP (ref 0.2–1.2)
BILIRUB SERPL-MCNC: 0.4 MG/DL — SIGNIFICANT CHANGE UP (ref 0.2–1.2)
BILIRUB SERPL-MCNC: 0.5 MG/DL — SIGNIFICANT CHANGE UP (ref 0.2–1.2)
BILIRUB SERPL-MCNC: 0.5 MG/DL — SIGNIFICANT CHANGE UP (ref 0.2–1.2)
BILIRUB SERPL-MCNC: 0.8 MG/DL — SIGNIFICANT CHANGE UP (ref 0.2–1.2)
BILIRUB SERPL-MCNC: 1 MG/DL — SIGNIFICANT CHANGE UP (ref 0.2–1.2)
BILIRUB UR-MCNC: NEGATIVE — SIGNIFICANT CHANGE UP
BUN SERPL-MCNC: 21 MG/DL — SIGNIFICANT CHANGE UP (ref 7–23)
BUN SERPL-MCNC: 36 MG/DL — HIGH (ref 7–23)
BUN SERPL-MCNC: 37 MG/DL — HIGH (ref 7–23)
BUN SERPL-MCNC: 38 MG/DL — HIGH (ref 7–23)
BUN SERPL-MCNC: 56 MG/DL — HIGH (ref 7–23)
BURR CELLS BLD QL SMEAR: PRESENT — SIGNIFICANT CHANGE UP
CA-I SERPL-SCNC: 1.28 MMOL/L — SIGNIFICANT CHANGE UP (ref 1.15–1.33)
CALCIUM SERPL-MCNC: 8.9 MG/DL — SIGNIFICANT CHANGE UP (ref 8.4–10.5)
CALCIUM SERPL-MCNC: 9 MG/DL — SIGNIFICANT CHANGE UP (ref 8.4–10.5)
CALCIUM SERPL-MCNC: 9.1 MG/DL — SIGNIFICANT CHANGE UP (ref 8.4–10.5)
CALCIUM SERPL-MCNC: 9.2 MG/DL — SIGNIFICANT CHANGE UP (ref 8.4–10.5)
CALCIUM SERPL-MCNC: 9.3 MG/DL — SIGNIFICANT CHANGE UP (ref 8.4–10.5)
CHLORIDE BLDV-SCNC: 107 MMOL/L — SIGNIFICANT CHANGE UP (ref 96–108)
CHLORIDE SERPL-SCNC: 101 MMOL/L — SIGNIFICANT CHANGE UP (ref 96–108)
CHLORIDE SERPL-SCNC: 102 MMOL/L — SIGNIFICANT CHANGE UP (ref 96–108)
CHLORIDE SERPL-SCNC: 104 MMOL/L — SIGNIFICANT CHANGE UP (ref 96–108)
CHLORIDE SERPL-SCNC: 105 MMOL/L — SIGNIFICANT CHANGE UP (ref 96–108)
CHLORIDE SERPL-SCNC: 109 MMOL/L — HIGH (ref 96–108)
CK SERPL-CCNC: 143 U/L — SIGNIFICANT CHANGE UP (ref 25–170)
CO2 BLDV-SCNC: 23 MMOL/L — SIGNIFICANT CHANGE UP (ref 22–26)
CO2 SERPL-SCNC: 20 MMOL/L — LOW (ref 22–31)
CO2 SERPL-SCNC: 21 MMOL/L — LOW (ref 22–31)
CO2 SERPL-SCNC: 21 MMOL/L — LOW (ref 22–31)
CO2 SERPL-SCNC: 23 MMOL/L — SIGNIFICANT CHANGE UP (ref 22–31)
CO2 SERPL-SCNC: 24 MMOL/L — SIGNIFICANT CHANGE UP (ref 22–31)
COLOR SPEC: YELLOW — SIGNIFICANT CHANGE UP
CREAT SERPL-MCNC: 0.96 MG/DL — SIGNIFICANT CHANGE UP (ref 0.5–1.3)
CREAT SERPL-MCNC: 0.98 MG/DL — SIGNIFICANT CHANGE UP (ref 0.5–1.3)
CREAT SERPL-MCNC: 1 MG/DL — SIGNIFICANT CHANGE UP (ref 0.5–1.3)
CREAT SERPL-MCNC: 1.08 MG/DL — SIGNIFICANT CHANGE UP (ref 0.5–1.3)
CREAT SERPL-MCNC: 1.09 MG/DL — SIGNIFICANT CHANGE UP (ref 0.5–1.3)
CREAT SERPL-MCNC: 1.55 MG/DL — HIGH (ref 0.5–1.3)
CRP SERPL-MCNC: 4 MG/L — SIGNIFICANT CHANGE UP (ref 0–4)
CULTURE RESULTS: SIGNIFICANT CHANGE UP
D DIMER BLD IA.RAPID-MCNC: <150 NG/ML DDU — SIGNIFICANT CHANGE UP
DIFF PNL FLD: NEGATIVE — SIGNIFICANT CHANGE UP
EGFR: 30 ML/MIN/1.73M2 — LOW
EGFR: 46 ML/MIN/1.73M2 — LOW
EGFR: 46 ML/MIN/1.73M2 — LOW
EGFR: 51 ML/MIN/1.73M2 — LOW
EGFR: 52 ML/MIN/1.73M2 — LOW
EGFR: 53 ML/MIN/1.73M2 — LOW
EOSINOPHIL # BLD AUTO: 0.06 K/UL — SIGNIFICANT CHANGE UP (ref 0–0.5)
EOSINOPHIL # BLD AUTO: 0.25 K/UL — SIGNIFICANT CHANGE UP (ref 0–0.5)
EOSINOPHIL NFR BLD AUTO: 1.1 % — SIGNIFICANT CHANGE UP (ref 0–6)
EOSINOPHIL NFR BLD AUTO: 3.5 % — SIGNIFICANT CHANGE UP (ref 0–6)
EPI CELLS # UR: 3 /HPF — SIGNIFICANT CHANGE UP
ERYTHROCYTE [SEDIMENTATION RATE] IN BLOOD: 23 MM/HR — HIGH (ref 0–20)
FERRITIN SERPL-MCNC: 168 NG/ML — HIGH (ref 15–150)
GAS PNL BLDV: 136 MMOL/L — SIGNIFICANT CHANGE UP (ref 136–145)
GAS PNL BLDV: SIGNIFICANT CHANGE UP
GAS PNL BLDV: SIGNIFICANT CHANGE UP
GIANT PLATELETS BLD QL SMEAR: PRESENT — SIGNIFICANT CHANGE UP
GLUCOSE BLDV-MCNC: 82 MG/DL — SIGNIFICANT CHANGE UP (ref 70–99)
GLUCOSE SERPL-MCNC: 144 MG/DL — HIGH (ref 70–99)
GLUCOSE SERPL-MCNC: 84 MG/DL — SIGNIFICANT CHANGE UP (ref 70–99)
GLUCOSE SERPL-MCNC: 90 MG/DL — SIGNIFICANT CHANGE UP (ref 70–99)
GLUCOSE UR QL: NEGATIVE — SIGNIFICANT CHANGE UP
HCO3 BLDV-SCNC: 21 MMOL/L — LOW (ref 22–29)
HCT VFR BLD CALC: 37.8 % — SIGNIFICANT CHANGE UP (ref 34.5–45)
HCT VFR BLD CALC: 38.3 % — SIGNIFICANT CHANGE UP (ref 34.5–45)
HCT VFR BLD CALC: 41.7 % — SIGNIFICANT CHANGE UP (ref 34.5–45)
HCT VFR BLD CALC: 42.9 % — SIGNIFICANT CHANGE UP (ref 34.5–45)
HCT VFR BLD CALC: 43.9 % — SIGNIFICANT CHANGE UP (ref 34.5–45)
HCT VFR BLDA CALC: 39 % — SIGNIFICANT CHANGE UP (ref 34.5–46.5)
HGB BLD CALC-MCNC: 12.9 G/DL — SIGNIFICANT CHANGE UP (ref 11.7–16.1)
HGB BLD-MCNC: 12.6 G/DL — SIGNIFICANT CHANGE UP (ref 11.5–15.5)
HGB BLD-MCNC: 12.8 G/DL — SIGNIFICANT CHANGE UP (ref 11.5–15.5)
HGB BLD-MCNC: 13.1 G/DL — SIGNIFICANT CHANGE UP (ref 11.5–15.5)
HGB BLD-MCNC: 13.3 G/DL — SIGNIFICANT CHANGE UP (ref 11.5–15.5)
HGB BLD-MCNC: 14.4 G/DL — SIGNIFICANT CHANGE UP (ref 11.5–15.5)
HYALINE CASTS # UR AUTO: 2 /LPF — SIGNIFICANT CHANGE UP (ref 0–2)
IMM GRANULOCYTES NFR BLD AUTO: 0.5 % — SIGNIFICANT CHANGE UP (ref 0–0.9)
INR BLD: 2.22 RATIO — HIGH (ref 0.88–1.16)
INR BLD: 2.81 RATIO — HIGH (ref 0.88–1.16)
INR BLD: 3 RATIO — HIGH (ref 0.88–1.16)
INR BLD: 3.14 RATIO — HIGH (ref 0.88–1.16)
INR BLD: 3.49 RATIO — HIGH (ref 0.88–1.16)
INR BLD: 4.37 RATIO — HIGH (ref 0.88–1.16)
INR PPP: 1.04
INR PPP: 1.6
INR PPP: 1.63
INR PPP: 1.8
INR PPP: 1.8
INR PPP: 1.83
INR PPP: 1.84
INR PPP: 1.85
INR PPP: 1.85
INR PPP: 1.89
INR PPP: 1.94
INR PPP: 1.98
INR PPP: 2
INR PPP: 2.05
INR PPP: 2.07
INR PPP: 2.09
INR PPP: 2.13
INR PPP: 2.22
INR PPP: 2.29
INR PPP: 3.16
INR PPP: 3.18
INR PPP: 3.46
KETONES UR-MCNC: NEGATIVE — SIGNIFICANT CHANGE UP
LACTATE BLDV-MCNC: 2.5 MMOL/L — HIGH (ref 0.5–2)
LDH SERPL L TO P-CCNC: 184 U/L — SIGNIFICANT CHANGE UP (ref 50–242)
LEUKOCYTE ESTERASE UR-ACNC: ABNORMAL
LYMPHOCYTES # BLD AUTO: 1.57 K/UL — SIGNIFICANT CHANGE UP (ref 1–3.3)
LYMPHOCYTES # BLD AUTO: 1.65 K/UL — SIGNIFICANT CHANGE UP (ref 1–3.3)
LYMPHOCYTES # BLD AUTO: 21.9 % — SIGNIFICANT CHANGE UP (ref 13–44)
LYMPHOCYTES # BLD AUTO: 29.8 % — SIGNIFICANT CHANGE UP (ref 13–44)
MACROCYTES BLD QL: SLIGHT — SIGNIFICANT CHANGE UP
MAGNESIUM SERPL-MCNC: 1.8 MG/DL — SIGNIFICANT CHANGE UP (ref 1.6–2.6)
MAGNESIUM SERPL-MCNC: 2 MG/DL — SIGNIFICANT CHANGE UP (ref 1.6–2.6)
MAGNESIUM SERPL-MCNC: 2 MG/DL — SIGNIFICANT CHANGE UP (ref 1.6–2.6)
MCHC RBC-ENTMCNC: 30.5 GM/DL — LOW (ref 32–36)
MCHC RBC-ENTMCNC: 31.9 GM/DL — LOW (ref 32–36)
MCHC RBC-ENTMCNC: 32 PG — SIGNIFICANT CHANGE UP (ref 27–34)
MCHC RBC-ENTMCNC: 32.2 PG — SIGNIFICANT CHANGE UP (ref 27–34)
MCHC RBC-ENTMCNC: 32.3 PG — SIGNIFICANT CHANGE UP (ref 27–34)
MCHC RBC-ENTMCNC: 32.3 PG — SIGNIFICANT CHANGE UP (ref 27–34)
MCHC RBC-ENTMCNC: 32.4 PG — SIGNIFICANT CHANGE UP (ref 27–34)
MCHC RBC-ENTMCNC: 32.8 GM/DL — SIGNIFICANT CHANGE UP (ref 32–36)
MCHC RBC-ENTMCNC: 33.3 GM/DL — SIGNIFICANT CHANGE UP (ref 32–36)
MCHC RBC-ENTMCNC: 33.4 GM/DL — SIGNIFICANT CHANGE UP (ref 32–36)
MCV RBC AUTO: 100.5 FL — HIGH (ref 80–100)
MCV RBC AUTO: 105.9 FL — HIGH (ref 80–100)
MCV RBC AUTO: 96.2 FL — SIGNIFICANT CHANGE UP (ref 80–100)
MCV RBC AUTO: 96.9 FL — SIGNIFICANT CHANGE UP (ref 80–100)
MCV RBC AUTO: 98.7 FL — SIGNIFICANT CHANGE UP (ref 80–100)
METHOD TYPE: SIGNIFICANT CHANGE UP
MONOCYTES # BLD AUTO: 0.57 K/UL — SIGNIFICANT CHANGE UP (ref 0–0.9)
MONOCYTES # BLD AUTO: 0.59 K/UL — SIGNIFICANT CHANGE UP (ref 0–0.9)
MONOCYTES NFR BLD AUTO: 10.6 % — SIGNIFICANT CHANGE UP (ref 2–14)
MONOCYTES NFR BLD AUTO: 7.9 % — SIGNIFICANT CHANGE UP (ref 2–14)
NEUTROPHILS # BLD AUTO: 3.19 K/UL — SIGNIFICANT CHANGE UP (ref 1.8–7.4)
NEUTROPHILS # BLD AUTO: 4.15 K/UL — SIGNIFICANT CHANGE UP (ref 1.8–7.4)
NEUTROPHILS NFR BLD AUTO: 57.6 % — SIGNIFICANT CHANGE UP (ref 43–77)
NEUTROPHILS NFR BLD AUTO: 57.9 % — SIGNIFICANT CHANGE UP (ref 43–77)
NITRITE UR-MCNC: POSITIVE
NRBC # BLD: 0 /100 WBCS — SIGNIFICANT CHANGE UP (ref 0–0)
NRBC # BLD: 1 /100 — HIGH (ref 0–0)
NT-PROBNP SERPL-SCNC: 9302 PG/ML — HIGH (ref 0–300)
ORGANISM # SPEC MICROSCOPIC CNT: SIGNIFICANT CHANGE UP
ORGANISM # SPEC MICROSCOPIC CNT: SIGNIFICANT CHANGE UP
PCO2 BLDV: 60 MMHG — HIGH (ref 39–42)
PH BLDV: 7.15 — CRITICAL LOW (ref 7.32–7.43)
PH UR: 5.5 — SIGNIFICANT CHANGE UP (ref 5–8)
PHOSPHATE SERPL-MCNC: 2.7 MG/DL — SIGNIFICANT CHANGE UP (ref 2.5–4.5)
PHOSPHATE SERPL-MCNC: 3 MG/DL — SIGNIFICANT CHANGE UP (ref 2.5–4.5)
PHOSPHATE SERPL-MCNC: 3.1 MG/DL — SIGNIFICANT CHANGE UP (ref 2.5–4.5)
PLAT MORPH BLD: NORMAL — SIGNIFICANT CHANGE UP
PLATELET # BLD AUTO: 208 K/UL — SIGNIFICANT CHANGE UP (ref 150–400)
PLATELET # BLD AUTO: 224 K/UL — SIGNIFICANT CHANGE UP (ref 150–400)
PLATELET # BLD AUTO: 228 K/UL — SIGNIFICANT CHANGE UP (ref 150–400)
PLATELET # BLD AUTO: 236 K/UL — SIGNIFICANT CHANGE UP (ref 150–400)
PLATELET # BLD AUTO: 288 K/UL — SIGNIFICANT CHANGE UP (ref 150–400)
PO2 BLDV: 27 MMHG — SIGNIFICANT CHANGE UP (ref 25–45)
POIKILOCYTOSIS BLD QL AUTO: SIGNIFICANT CHANGE UP
POTASSIUM BLDV-SCNC: 5 MMOL/L — SIGNIFICANT CHANGE UP (ref 3.5–5.1)
POTASSIUM SERPL-MCNC: 4.6 MMOL/L — SIGNIFICANT CHANGE UP (ref 3.5–5.3)
POTASSIUM SERPL-MCNC: 4.7 MMOL/L — SIGNIFICANT CHANGE UP (ref 3.5–5.3)
POTASSIUM SERPL-MCNC: 5 MMOL/L — SIGNIFICANT CHANGE UP (ref 3.5–5.3)
POTASSIUM SERPL-MCNC: 5.2 MMOL/L — SIGNIFICANT CHANGE UP (ref 3.5–5.3)
POTASSIUM SERPL-MCNC: 5.3 MMOL/L — SIGNIFICANT CHANGE UP (ref 3.5–5.3)
POTASSIUM SERPL-SCNC: 4.6 MMOL/L — SIGNIFICANT CHANGE UP (ref 3.5–5.3)
POTASSIUM SERPL-SCNC: 4.7 MMOL/L — SIGNIFICANT CHANGE UP (ref 3.5–5.3)
POTASSIUM SERPL-SCNC: 5 MMOL/L — SIGNIFICANT CHANGE UP (ref 3.5–5.3)
POTASSIUM SERPL-SCNC: 5.2 MMOL/L — SIGNIFICANT CHANGE UP (ref 3.5–5.3)
POTASSIUM SERPL-SCNC: 5.3 MMOL/L — SIGNIFICANT CHANGE UP (ref 3.5–5.3)
PROT SERPL-MCNC: 6.2 G/DL — SIGNIFICANT CHANGE UP (ref 6–8.3)
PROT SERPL-MCNC: 6.4 G/DL — SIGNIFICANT CHANGE UP (ref 6–8.3)
PROT SERPL-MCNC: 6.6 G/DL — SIGNIFICANT CHANGE UP (ref 6–8.3)
PROT SERPL-MCNC: 6.6 G/DL — SIGNIFICANT CHANGE UP (ref 6–8.3)
PROT SERPL-MCNC: 6.9 G/DL — SIGNIFICANT CHANGE UP (ref 6–8.3)
PROT SERPL-MCNC: 7.1 G/DL — SIGNIFICANT CHANGE UP (ref 6–8.3)
PROT UR-MCNC: ABNORMAL
PROTHROM AB SERPL-ACNC: 25.7 SEC — HIGH (ref 10.6–13.6)
PROTHROM AB SERPL-ACNC: 32.9 SEC — HIGH (ref 10.5–13.4)
PROTHROM AB SERPL-ACNC: 35.2 SEC — HIGH (ref 10.5–13.4)
PROTHROM AB SERPL-ACNC: 36.5 SEC — HIGH (ref 10.5–13.4)
PROTHROM AB SERPL-ACNC: 40.6 SEC — HIGH (ref 10.5–13.4)
PROTHROM AB SERPL-ACNC: 51 SEC — HIGH (ref 10.5–13.4)
PROTHROMBIN TIME COMMENT: NORMAL
PT BLD: 12.2
PT BLD: 19.2
PT BLD: 19.7
PT BLD: 21.2
PT BLD: 21.5
PT BLD: 21.5
PT BLD: 21.7
PT BLD: 22.5
PT BLD: 23
PT BLD: 23.3
PT BLD: 23.4
PT BLD: 24.2
PT BLD: 24.4
PT BLD: 24.8
PT BLD: 25.1
PT BLD: 26.4
PT BLD: 27
PT BLD: 37.4
PT BLD: 37.7
PT BLD: 41.4
RAPID RVP RESULT: DETECTED
RAPID RVP RESULT: SIGNIFICANT CHANGE UP
RBC # BLD: 3.9 M/UL — SIGNIFICANT CHANGE UP (ref 3.8–5.2)
RBC # BLD: 3.98 M/UL — SIGNIFICANT CHANGE UP (ref 3.8–5.2)
RBC # BLD: 4.05 M/UL — SIGNIFICANT CHANGE UP (ref 3.8–5.2)
RBC # BLD: 4.15 M/UL — SIGNIFICANT CHANGE UP (ref 3.8–5.2)
RBC # BLD: 4.45 M/UL — SIGNIFICANT CHANGE UP (ref 3.8–5.2)
RBC # FLD: 14.1 % — SIGNIFICANT CHANGE UP (ref 10.3–14.5)
RBC # FLD: 14.2 % — SIGNIFICANT CHANGE UP (ref 10.3–14.5)
RBC # FLD: 14.3 % — SIGNIFICANT CHANGE UP (ref 10.3–14.5)
RBC # FLD: 14.6 % — HIGH (ref 10.3–14.5)
RBC # FLD: 17.8 % — HIGH (ref 10.3–14.5)
RBC BLD AUTO: ABNORMAL
RBC CASTS # UR COMP ASSIST: 2 /HPF — SIGNIFICANT CHANGE UP (ref 0–4)
SAO2 % BLDV: 27 % — LOW (ref 67–88)
SARS-COV-2 RNA SPEC QL NAA+PROBE: DETECTED
SARS-COV-2 RNA SPEC QL NAA+PROBE: SIGNIFICANT CHANGE UP
SODIUM SERPL-SCNC: 134 MMOL/L — LOW (ref 135–145)
SODIUM SERPL-SCNC: 138 MMOL/L — SIGNIFICANT CHANGE UP (ref 135–145)
SODIUM SERPL-SCNC: 141 MMOL/L — SIGNIFICANT CHANGE UP (ref 135–145)
SP GR SPEC: 1.03 — HIGH (ref 1.01–1.02)
SPECIMEN SOURCE: SIGNIFICANT CHANGE UP
TROPONIN T, HIGH SENSITIVITY RESULT: 39 NG/L — SIGNIFICANT CHANGE UP (ref 0–51)
TSH SERPL-MCNC: 6.12 UIU/ML — HIGH (ref 0.27–4.2)
UROBILINOGEN FLD QL: NEGATIVE — SIGNIFICANT CHANGE UP
VARIANT LYMPHS # BLD: 8.8 % — HIGH (ref 0–6)
WBC # BLD: 10.26 K/UL — SIGNIFICANT CHANGE UP (ref 3.8–10.5)
WBC # BLD: 5.54 K/UL — SIGNIFICANT CHANGE UP (ref 3.8–10.5)
WBC # BLD: 7.16 K/UL — SIGNIFICANT CHANGE UP (ref 3.8–10.5)
WBC # BLD: 7.22 K/UL — SIGNIFICANT CHANGE UP (ref 3.8–10.5)
WBC # BLD: 7.78 K/UL — SIGNIFICANT CHANGE UP (ref 3.8–10.5)
WBC # FLD AUTO: 10.26 K/UL — SIGNIFICANT CHANGE UP (ref 3.8–10.5)
WBC # FLD AUTO: 5.54 K/UL — SIGNIFICANT CHANGE UP (ref 3.8–10.5)
WBC # FLD AUTO: 7.16 K/UL — SIGNIFICANT CHANGE UP (ref 3.8–10.5)
WBC # FLD AUTO: 7.22 K/UL — SIGNIFICANT CHANGE UP (ref 3.8–10.5)
WBC # FLD AUTO: 7.78 K/UL — SIGNIFICANT CHANGE UP (ref 3.8–10.5)
WBC UR QL: 12 /HPF — HIGH (ref 0–5)

## 2022-01-01 PROCEDURE — 99497 ADVNCD CARE PLAN 30 MIN: CPT | Mod: 25

## 2022-01-01 PROCEDURE — 85652 RBC SED RATE AUTOMATED: CPT

## 2022-01-01 PROCEDURE — 99284 EMERGENCY DEPT VISIT MOD MDM: CPT

## 2022-01-01 PROCEDURE — 94660 CPAP INITIATION&MGMT: CPT

## 2022-01-01 PROCEDURE — 93308 TTE F-UP OR LMTD: CPT

## 2022-01-01 PROCEDURE — 96374 THER/PROPH/DIAG INJ IV PUSH: CPT

## 2022-01-01 PROCEDURE — 84443 ASSAY THYROID STIM HORMONE: CPT

## 2022-01-01 PROCEDURE — 93294 REM INTERROG EVL PM/LDLS PM: CPT

## 2022-01-01 PROCEDURE — 82803 BLOOD GASES ANY COMBINATION: CPT

## 2022-01-01 PROCEDURE — 70450 CT HEAD/BRAIN W/O DYE: CPT | Mod: 26,MA

## 2022-01-01 PROCEDURE — 82435 ASSAY OF BLOOD CHLORIDE: CPT

## 2022-01-01 PROCEDURE — 84132 ASSAY OF SERUM POTASSIUM: CPT

## 2022-01-01 PROCEDURE — 93296 REM INTERROG EVL PM/IDS: CPT

## 2022-01-01 PROCEDURE — 85027 COMPLETE CBC AUTOMATED: CPT

## 2022-01-01 PROCEDURE — 76377 3D RENDER W/INTRP POSTPROCES: CPT

## 2022-01-01 PROCEDURE — 83605 ASSAY OF LACTIC ACID: CPT

## 2022-01-01 PROCEDURE — 93308 TTE F-UP OR LMTD: CPT | Mod: 26

## 2022-01-01 PROCEDURE — 71045 X-RAY EXAM CHEST 1 VIEW: CPT | Mod: 26

## 2022-01-01 PROCEDURE — 82947 ASSAY GLUCOSE BLOOD QUANT: CPT

## 2022-01-01 PROCEDURE — 80053 COMPREHEN METABOLIC PANEL: CPT

## 2022-01-01 PROCEDURE — 71045 X-RAY EXAM CHEST 1 VIEW: CPT

## 2022-01-01 PROCEDURE — 0225U NFCT DS DNA&RNA 21 SARSCOV2: CPT

## 2022-01-01 PROCEDURE — 70450 CT HEAD/BRAIN W/O DYE: CPT | Mod: MA

## 2022-01-01 PROCEDURE — 85730 THROMBOPLASTIN TIME PARTIAL: CPT

## 2022-01-01 PROCEDURE — 82962 GLUCOSE BLOOD TEST: CPT

## 2022-01-01 PROCEDURE — 99285 EMERGENCY DEPT VISIT HI MDM: CPT

## 2022-01-01 PROCEDURE — 85018 HEMOGLOBIN: CPT

## 2022-01-01 PROCEDURE — 87086 URINE CULTURE/COLONY COUNT: CPT

## 2022-01-01 PROCEDURE — 80076 HEPATIC FUNCTION PANEL: CPT

## 2022-01-01 PROCEDURE — 82330 ASSAY OF CALCIUM: CPT

## 2022-01-01 PROCEDURE — 93280 PM DEVICE PROGR EVAL DUAL: CPT

## 2022-01-01 PROCEDURE — 83880 ASSAY OF NATRIURETIC PEPTIDE: CPT

## 2022-01-01 PROCEDURE — 84100 ASSAY OF PHOSPHORUS: CPT

## 2022-01-01 PROCEDURE — 70486 CT MAXILLOFACIAL W/O DYE: CPT | Mod: MA

## 2022-01-01 PROCEDURE — 86140 C-REACTIVE PROTEIN: CPT

## 2022-01-01 PROCEDURE — 36415 COLL VENOUS BLD VENIPUNCTURE: CPT

## 2022-01-01 PROCEDURE — 82728 ASSAY OF FERRITIN: CPT

## 2022-01-01 PROCEDURE — 97161 PT EVAL LOW COMPLEX 20 MIN: CPT

## 2022-01-01 PROCEDURE — 84295 ASSAY OF SERUM SODIUM: CPT

## 2022-01-01 PROCEDURE — 76377 3D RENDER W/INTRP POSTPROCES: CPT | Mod: 26

## 2022-01-01 PROCEDURE — 85610 PROTHROMBIN TIME: CPT

## 2022-01-01 PROCEDURE — 71275 CT ANGIOGRAPHY CHEST: CPT | Mod: 26,MA

## 2022-01-01 PROCEDURE — 85014 HEMATOCRIT: CPT

## 2022-01-01 PROCEDURE — 72125 CT NECK SPINE W/O DYE: CPT | Mod: MA

## 2022-01-01 PROCEDURE — 85025 COMPLETE CBC W/AUTO DIFF WBC: CPT

## 2022-01-01 PROCEDURE — 99284 EMERGENCY DEPT VISIT MOD MDM: CPT | Mod: 25

## 2022-01-01 PROCEDURE — 82550 ASSAY OF CK (CPK): CPT

## 2022-01-01 PROCEDURE — 83615 LACTATE (LD) (LDH) ENZYME: CPT

## 2022-01-01 PROCEDURE — 87040 BLOOD CULTURE FOR BACTERIA: CPT

## 2022-01-01 PROCEDURE — 99291 CRITICAL CARE FIRST HOUR: CPT | Mod: 25

## 2022-01-01 PROCEDURE — 82565 ASSAY OF CREATININE: CPT

## 2022-01-01 PROCEDURE — 81001 URINALYSIS AUTO W/SCOPE: CPT

## 2022-01-01 PROCEDURE — 87186 SC STD MICRODIL/AGAR DIL: CPT

## 2022-01-01 PROCEDURE — 84484 ASSAY OF TROPONIN QUANT: CPT

## 2022-01-01 PROCEDURE — 83735 ASSAY OF MAGNESIUM: CPT

## 2022-01-01 PROCEDURE — 72125 CT NECK SPINE W/O DYE: CPT | Mod: 26,MA

## 2022-01-01 PROCEDURE — 70486 CT MAXILLOFACIAL W/O DYE: CPT | Mod: 26,MA

## 2022-01-01 PROCEDURE — 85379 FIBRIN DEGRADATION QUANT: CPT

## 2022-01-01 PROCEDURE — 99291 CRITICAL CARE FIRST HOUR: CPT

## 2022-01-01 PROCEDURE — 71275 CT ANGIOGRAPHY CHEST: CPT | Mod: MA

## 2022-01-01 PROCEDURE — 99223 1ST HOSP IP/OBS HIGH 75: CPT

## 2022-01-01 RX ORDER — SODIUM CHLORIDE 9 MG/ML
1000 INJECTION INTRAMUSCULAR; INTRAVENOUS; SUBCUTANEOUS
Refills: 0 | Status: DISCONTINUED | OUTPATIENT
Start: 2022-01-01 | End: 2022-01-01

## 2022-01-01 RX ORDER — DEXAMETHASONE 0.5 MG/5ML
1 ELIXIR ORAL
Qty: 5 | Refills: 0
Start: 2022-01-01 | End: 2022-01-01

## 2022-01-01 RX ORDER — WARFARIN SODIUM 2.5 MG/1
2 TABLET ORAL ONCE
Refills: 0 | Status: COMPLETED | OUTPATIENT
Start: 2022-01-01 | End: 2022-01-01

## 2022-01-01 RX ORDER — CEFDINIR 250 MG/5ML
1 POWDER, FOR SUSPENSION ORAL
Qty: 0 | Refills: 0 | DISCHARGE

## 2022-01-01 RX ORDER — WARFARIN SODIUM 2.5 MG/1
2.5 TABLET ORAL ONCE
Refills: 0 | Status: COMPLETED | OUTPATIENT
Start: 2022-01-01 | End: 2022-01-01

## 2022-01-01 RX ORDER — WARFARIN SODIUM 2.5 MG/1
2.5 TABLET ORAL ONCE
Refills: 0 | Status: DISCONTINUED | OUTPATIENT
Start: 2022-01-01 | End: 2022-01-01

## 2022-01-01 RX ORDER — NIFEDIPINE 30 MG
30 TABLET, EXTENDED RELEASE 24 HR ORAL DAILY
Refills: 0 | Status: DISCONTINUED | OUTPATIENT
Start: 2022-01-01 | End: 2022-01-01

## 2022-01-01 RX ORDER — CHOLECALCIFEROL (VITAMIN D3) 125 MCG
1000 CAPSULE ORAL DAILY
Refills: 0 | Status: DISCONTINUED | OUTPATIENT
Start: 2022-01-01 | End: 2022-01-01

## 2022-01-01 RX ORDER — METOPROLOL TARTRATE 50 MG
25 TABLET ORAL
Refills: 0 | Status: DISCONTINUED | OUTPATIENT
Start: 2022-01-01 | End: 2022-01-01

## 2022-01-01 RX ORDER — FUROSEMIDE 40 MG
40 TABLET ORAL ONCE
Refills: 0 | Status: COMPLETED | OUTPATIENT
Start: 2022-01-01 | End: 2022-01-01

## 2022-01-01 RX ORDER — REMDESIVIR 5 MG/ML
200 INJECTION INTRAVENOUS EVERY 24 HOURS
Refills: 0 | Status: COMPLETED | OUTPATIENT
Start: 2022-01-01 | End: 2022-01-01

## 2022-01-01 RX ORDER — REMDESIVIR 5 MG/ML
INJECTION INTRAVENOUS
Refills: 0 | Status: COMPLETED | OUTPATIENT
Start: 2022-01-01 | End: 2022-01-01

## 2022-01-01 RX ORDER — SODIUM CHLORIDE 9 MG/ML
1000 INJECTION INTRAMUSCULAR; INTRAVENOUS; SUBCUTANEOUS ONCE
Refills: 0 | Status: DISCONTINUED | OUTPATIENT
Start: 2022-01-01 | End: 2022-01-01

## 2022-01-01 RX ORDER — CEFTRIAXONE 500 MG/1
INJECTION, POWDER, FOR SOLUTION INTRAMUSCULAR; INTRAVENOUS
Refills: 0 | Status: DISCONTINUED | OUTPATIENT
Start: 2022-01-01 | End: 2022-01-01

## 2022-01-01 RX ORDER — ATORVASTATIN CALCIUM 80 MG/1
1 TABLET, FILM COATED ORAL
Qty: 0 | Refills: 0 | DISCHARGE

## 2022-01-01 RX ORDER — SODIUM CHLORIDE 9 MG/ML
250 INJECTION INTRAMUSCULAR; INTRAVENOUS; SUBCUTANEOUS ONCE
Refills: 0 | Status: COMPLETED | OUTPATIENT
Start: 2022-01-01 | End: 2022-01-01

## 2022-01-01 RX ORDER — DEXAMETHASONE 0.5 MG/5ML
1 ELIXIR ORAL
Qty: 0 | Refills: 0 | DISCHARGE
Start: 2022-01-01

## 2022-01-01 RX ORDER — HYDROMORPHONE HYDROCHLORIDE 2 MG/ML
0.2 INJECTION INTRAMUSCULAR; INTRAVENOUS; SUBCUTANEOUS
Refills: 0 | Status: DISCONTINUED | OUTPATIENT
Start: 2022-01-01 | End: 2022-01-01

## 2022-01-01 RX ORDER — CEFDINIR 250 MG/5ML
1 POWDER, FOR SUSPENSION ORAL
Qty: 2 | Refills: 0
Start: 2022-01-01 | End: 2022-01-01

## 2022-01-01 RX ORDER — CHOLECALCIFEROL (VITAMIN D3) 125 MCG
1 CAPSULE ORAL
Qty: 0 | Refills: 0 | DISCHARGE

## 2022-01-01 RX ORDER — NIFEDIPINE 30 MG
1 TABLET, EXTENDED RELEASE 24 HR ORAL
Qty: 0 | Refills: 0 | DISCHARGE

## 2022-01-01 RX ORDER — ATORVASTATIN CALCIUM 80 MG/1
40 TABLET, FILM COATED ORAL AT BEDTIME
Refills: 0 | Status: DISCONTINUED | OUTPATIENT
Start: 2022-01-01 | End: 2022-01-01

## 2022-01-01 RX ORDER — WARFARIN 2.5 MG/1
2.5 TABLET ORAL
Qty: 225 | Refills: 1 | Status: DISCONTINUED | COMMUNITY
Start: 2021-03-02 | End: 2022-01-01

## 2022-01-01 RX ORDER — ATORVASTATIN CALCIUM 80 MG/1
20 TABLET, FILM COATED ORAL AT BEDTIME
Refills: 0 | Status: DISCONTINUED | OUTPATIENT
Start: 2022-01-01 | End: 2022-01-01

## 2022-01-01 RX ORDER — WARFARIN 2.5 MG/1
2.5 TABLET ORAL DAILY
Refills: 0 | Status: ACTIVE | COMMUNITY
Start: 2022-01-01

## 2022-01-01 RX ORDER — CEFTRIAXONE 500 MG/1
1000 INJECTION, POWDER, FOR SOLUTION INTRAMUSCULAR; INTRAVENOUS ONCE
Refills: 0 | Status: COMPLETED | OUTPATIENT
Start: 2022-01-01 | End: 2022-01-01

## 2022-01-01 RX ORDER — CHLORHEXIDINE GLUCONATE 213 G/1000ML
1 SOLUTION TOPICAL DAILY
Refills: 0 | Status: DISCONTINUED | OUTPATIENT
Start: 2022-01-01 | End: 2022-01-01

## 2022-01-01 RX ORDER — REMDESIVIR 5 MG/ML
100 INJECTION INTRAVENOUS EVERY 24 HOURS
Refills: 0 | Status: COMPLETED | OUTPATIENT
Start: 2022-01-01 | End: 2022-01-01

## 2022-01-01 RX ORDER — MORPHINE SULFATE 50 MG/1
2 CAPSULE, EXTENDED RELEASE ORAL EVERY 4 HOURS
Refills: 0 | Status: DISCONTINUED | OUTPATIENT
Start: 2022-01-01 | End: 2022-01-01

## 2022-01-01 RX ORDER — CEFTRIAXONE 500 MG/1
1000 INJECTION, POWDER, FOR SOLUTION INTRAMUSCULAR; INTRAVENOUS EVERY 24 HOURS
Refills: 0 | Status: DISCONTINUED | OUTPATIENT
Start: 2022-01-01 | End: 2022-01-01

## 2022-01-01 RX ORDER — METOPROLOL TARTRATE 50 MG
1 TABLET ORAL
Qty: 0 | Refills: 0 | DISCHARGE

## 2022-01-01 RX ORDER — WARFARIN 1 MG/1
1 TABLET ORAL DAILY
Qty: 90 | Refills: 2 | Status: DISCONTINUED | COMMUNITY
Start: 2022-01-01 | End: 2022-01-01

## 2022-01-01 RX ORDER — DEXAMETHASONE 0.5 MG/5ML
6 ELIXIR ORAL DAILY
Refills: 0 | Status: DISCONTINUED | OUTPATIENT
Start: 2022-01-01 | End: 2022-01-01

## 2022-01-01 RX ORDER — DEXAMETHASONE 0.5 MG/5ML
6 ELIXIR ORAL ONCE
Refills: 0 | Status: COMPLETED | OUTPATIENT
Start: 2022-01-01 | End: 2022-01-01

## 2022-01-01 RX ORDER — INFLUENZA VIRUS VACCINE 15; 15; 15; 15 UG/.5ML; UG/.5ML; UG/.5ML; UG/.5ML
0.7 SUSPENSION INTRAMUSCULAR ONCE
Refills: 0 | Status: COMPLETED | OUTPATIENT
Start: 2022-01-01 | End: 2022-01-01

## 2022-01-01 RX ORDER — WARFARIN SODIUM 2.5 MG/1
1 TABLET ORAL
Qty: 0 | Refills: 0 | DISCHARGE

## 2022-01-01 RX ORDER — ACETAMINOPHEN 500 MG
650 TABLET ORAL ONCE
Refills: 0 | Status: COMPLETED | OUTPATIENT
Start: 2022-01-01 | End: 2022-01-01

## 2022-01-01 RX ORDER — SODIUM CHLORIDE 9 MG/ML
1550 INJECTION INTRAMUSCULAR; INTRAVENOUS; SUBCUTANEOUS ONCE
Refills: 0 | Status: DISCONTINUED | OUTPATIENT
Start: 2022-01-01 | End: 2022-01-01

## 2022-01-01 RX ADMIN — ATORVASTATIN CALCIUM 20 MILLIGRAM(S): 80 TABLET, FILM COATED ORAL at 21:39

## 2022-01-01 RX ADMIN — Medication 1000 UNIT(S): at 13:55

## 2022-01-01 RX ADMIN — WARFARIN SODIUM 2 MILLIGRAM(S): 2.5 TABLET ORAL at 21:12

## 2022-01-01 RX ADMIN — Medication 6 MILLIGRAM(S): at 05:57

## 2022-01-01 RX ADMIN — SODIUM CHLORIDE 1000 MILLILITER(S): 9 INJECTION INTRAMUSCULAR; INTRAVENOUS; SUBCUTANEOUS at 14:39

## 2022-01-01 RX ADMIN — SODIUM CHLORIDE 50 MILLILITER(S): 9 INJECTION INTRAMUSCULAR; INTRAVENOUS; SUBCUTANEOUS at 18:40

## 2022-01-01 RX ADMIN — Medication 6 MILLIGRAM(S): at 06:18

## 2022-01-01 RX ADMIN — CHLORHEXIDINE GLUCONATE 1 APPLICATION(S): 213 SOLUTION TOPICAL at 11:05

## 2022-01-01 RX ADMIN — Medication 25 MILLIGRAM(S): at 06:42

## 2022-01-01 RX ADMIN — ATORVASTATIN CALCIUM 20 MILLIGRAM(S): 80 TABLET, FILM COATED ORAL at 21:11

## 2022-01-01 RX ADMIN — Medication 30 MILLIGRAM(S): at 06:29

## 2022-01-01 RX ADMIN — WARFARIN SODIUM 2.5 MILLIGRAM(S): 2.5 TABLET ORAL at 00:01

## 2022-01-01 RX ADMIN — Medication 1000 UNIT(S): at 12:39

## 2022-01-01 RX ADMIN — REMDESIVIR 500 MILLIGRAM(S): 5 INJECTION INTRAVENOUS at 21:03

## 2022-01-01 RX ADMIN — CHLORHEXIDINE GLUCONATE 1 APPLICATION(S): 213 SOLUTION TOPICAL at 12:36

## 2022-01-01 RX ADMIN — REMDESIVIR 500 MILLIGRAM(S): 5 INJECTION INTRAVENOUS at 22:48

## 2022-01-01 RX ADMIN — Medication 25 MILLIGRAM(S): at 23:58

## 2022-01-01 RX ADMIN — Medication 30 MILLIGRAM(S): at 06:42

## 2022-01-01 RX ADMIN — CHLORHEXIDINE GLUCONATE 1 APPLICATION(S): 213 SOLUTION TOPICAL at 13:54

## 2022-01-01 RX ADMIN — ATORVASTATIN CALCIUM 20 MILLIGRAM(S): 80 TABLET, FILM COATED ORAL at 21:03

## 2022-01-01 RX ADMIN — Medication 25 MILLIGRAM(S): at 17:52

## 2022-01-01 RX ADMIN — ATORVASTATIN CALCIUM 40 MILLIGRAM(S): 80 TABLET, FILM COATED ORAL at 22:44

## 2022-01-01 RX ADMIN — Medication 6 MILLIGRAM(S): at 06:42

## 2022-01-01 RX ADMIN — HYDROMORPHONE HYDROCHLORIDE 0.2 MILLIGRAM(S): 2 INJECTION INTRAMUSCULAR; INTRAVENOUS; SUBCUTANEOUS at 10:20

## 2022-01-01 RX ADMIN — REMDESIVIR 500 MILLIGRAM(S): 5 INJECTION INTRAVENOUS at 14:26

## 2022-01-01 RX ADMIN — Medication 1000 UNIT(S): at 11:04

## 2022-01-01 RX ADMIN — REMDESIVIR 500 MILLIGRAM(S): 5 INJECTION INTRAVENOUS at 17:52

## 2022-01-01 RX ADMIN — Medication 25 MILLIGRAM(S): at 18:23

## 2022-01-01 RX ADMIN — WARFARIN SODIUM 2.5 MILLIGRAM(S): 2.5 TABLET ORAL at 21:03

## 2022-01-01 RX ADMIN — Medication 25 MILLIGRAM(S): at 17:42

## 2022-01-01 RX ADMIN — Medication 25 MILLIGRAM(S): at 06:29

## 2022-01-01 RX ADMIN — Medication 25 MILLIGRAM(S): at 17:01

## 2022-01-01 RX ADMIN — Medication 30 MILLIGRAM(S): at 05:56

## 2022-01-01 RX ADMIN — CEFTRIAXONE 100 MILLIGRAM(S): 500 INJECTION, POWDER, FOR SOLUTION INTRAMUSCULAR; INTRAVENOUS at 10:09

## 2022-01-01 RX ADMIN — Medication 30 MILLIGRAM(S): at 06:17

## 2022-01-01 RX ADMIN — Medication 25 MILLIGRAM(S): at 06:18

## 2022-01-01 RX ADMIN — REMDESIVIR 500 MILLIGRAM(S): 5 INJECTION INTRAVENOUS at 15:48

## 2022-01-01 RX ADMIN — WARFARIN SODIUM 2 MILLIGRAM(S): 2.5 TABLET ORAL at 21:39

## 2022-01-01 RX ADMIN — Medication 1000 UNIT(S): at 12:51

## 2022-01-01 RX ADMIN — CHLORHEXIDINE GLUCONATE 1 APPLICATION(S): 213 SOLUTION TOPICAL at 12:51

## 2022-01-01 RX ADMIN — Medication 6 MILLIGRAM(S): at 13:10

## 2022-01-01 RX ADMIN — Medication 25 MILLIGRAM(S): at 05:57

## 2022-01-01 RX ADMIN — CEFTRIAXONE 100 MILLIGRAM(S): 500 INJECTION, POWDER, FOR SOLUTION INTRAMUSCULAR; INTRAVENOUS at 10:33

## 2022-01-01 RX ADMIN — Medication 40 MILLIGRAM(S): at 13:25

## 2022-01-01 RX ADMIN — Medication 6 MILLIGRAM(S): at 06:29

## 2022-01-01 NOTE — ED PROVIDER NOTE - ATTENDING CONTRIBUTION TO CARE
------------ATTENDING NOTE------------   pt brought to ED by EMS after witnessed mechanical fall today, hit nose on ground, no LOC or HA/AMS, no nose bleed, mild ache in nose, no additional injuries/complaints, ambulatory at scene and in ED, complicated as INR of 9 48hrs ago (when stopped coumadin), awaiting imaging/labs and close reassessments -->  - Chema Winchester MD   --------------------------------------------- ------------ATTENDING NOTE------------   pt brought to ED by EMS after witnessed mechanical fall today, hit nose on ground, no LOC or HA/AMS, no nose bleed, mild ache in nose, no additional injuries/complaints, ambulatory at scene and in ED, complicated as INR of 9 48hrs ago (when stopped coumadin), awaiting imaging/labs and close reassessments --> remained stable, pleasant, no complaints, imaging w/o acute process, nml VS, tolerating PO, in depth dw pt and PCP about ddx, tx, burkett, continue close reassessments.  - Chema Winchester MD   ---------------------------------------------

## 2022-01-01 NOTE — ED PROVIDER NOTE - NS ED ROS FT
Gen: Denies fever, chills, generalized weakness  CV: Denies chest pain, palpitations  HEENT: Denies neck pain, headache, vision changes  Skin: Denies rash, erythema, color changes  Resp: Denies SOB, cough  Endo: Denies sensitivity to heat, cold, increased urination  GI: Denies constipation, nausea, vomiting, diarrhea  Msk: Denies back pain, LE swelling, extremity pain  : Denies dysuria, increased frequency  Neuro: Denies LOC, focal weakness, numbness, tingling  Psych: Denies hx of psych, SI, HI
no

## 2022-01-01 NOTE — ED ADULT NURSE NOTE - NSICDXPASTMEDICALHX_GEN_ALL_CORE_FT
PAST MEDICAL HISTORY:  Aortic stenosis     Atrial fibrillation on coumadin    H/O CHF     H/O hyperlipidemia     H/O: hypertension     Hearing loss bilateral, wears hearing aids bilaterall    History of complete AV block has pacemaker medtronic ADDR01 Adapta -- to be changed on 2-24-21    Murmur

## 2022-01-01 NOTE — ED PROVIDER NOTE - NSFOLLOWUPINSTRUCTIONS_ED_ALL_ED_FT
.  See your Primary Doctor this week for follow up -- call to discuss.    Stay well hydrated, eat regular healthy meals, get plenty of rest, use walker/cane.    CONTINUE STOP TAKING WARFARIN (Coumadin) UNTIL YOU SPEAK WITH YOUR DOCTOR.    See CONCUSSION information and return instructions given to you.    Seek immediate medical care for new/worsening symptoms/concerns. .  See your Primary Doctor this week for follow up -- call to discuss.    Continue to follow up with ophthalmology for the bleeding in your eye.     Stay well hydrated, eat regular healthy meals, get plenty of rest, use walker/cane.    CONTINUE STOP TAKING WARFARIN (Coumadin) UNTIL YOU SPEAK WITH YOUR DOCTOR.    Your primary care doctor requests that you stay off the coumadin tonight, and take 1mg tomorrow night. Call him directly for recommendations regarding your future doses.     See CONCUSSION information and return instructions given to you.    Seek immediate medical care for new/worsening symptoms/concerns.

## 2022-01-01 NOTE — ED ADULT NURSE NOTE - OBJECTIVE STATEMENT
89 year old female [facility] of 89 year old female PMH of afib (no longer on Coumadin as of 9 days ago due to INR being elevated), HTN, CAD presents to the ED via EMS from assisted living facility for witnessed fall from standing height, hit bridge of nose, no LOC, ambulatory after falling and in ED, A&Ox4, not complaining of pain, states "they made me come here. I feel fine." Patient states she fell due to right eyesight deficit that has been going on for 2 weeks, "blood in eye," which pt. states is being treated with only eye drops "because of age." Pt. states this is her first fall. Bridge of nose has red ruben, no other overt signs of trauma. 20g peripheral IV placed in R AC and labs drawn and sent to lab. Patient undressed and placed into gown, call bell in hand and side rails up with bed in lowest position for safety. blanket provided. Comfort and safety provided.

## 2022-01-01 NOTE — ED PROVIDER NOTE - OBJECTIVE STATEMENT
89 y/o F with PMH of PAF(on Coumadin and last dose 3 days ago was held as patients' INR supratheraputic), HTN, HLD, Complete AVB s/p PPM presenting for mechanical fall. Patient states she recently had a hemorrhage in her right eye and has blurry vision and since has felt unsteady on her feet. Today patient had mechanical fall and fell on her face/nose. States that initially had pain at her nose but it has subsided. No other complaints. Denies LOC, hitting head or neck.

## 2022-01-01 NOTE — ED PROVIDER NOTE - PHYSICAL EXAMINATION
GENERAL: Patient lying in bed comfortably. In no acute distress. Answering questions appropriately.   HEENT: Mild ecchymosis over nose. Otherwise NC/AT, No intraseptal hematoma, No intraoral trauma, PERRL, EOMI b/l, conjunctiva noninjected and anicteric,  moist mucous membranes  NECK: Supple, trachea midline  LUNG: CTAB, no w/r/r appreciated, good respiratory effort  CV: RRR, no m/r/g appreciated  ABDOMEN: Soft, NTND, no rebound or guarding, no appreciable organomegaly  BACK: No midline tenderness  MSK: No edema, no visible deformities, full range of motion UE/LE b/l, 5/5 muscle strength UE/LE b/l  NEURO: AAOx4 (to person, place, time, event), CN II-XII grossly intact, sensation grossly intact, no pronator drift  SKIN: Warm, dry, well perfused, no evidence of rash  PSYCH: Normal mood and affect

## 2022-01-01 NOTE — ED ADULT NURSE NOTE - CAS ELECT INFOMATION PROVIDED
dc by MD Dove and taken by wheelchair to waiting room by MD prior to nurse reasessment/DC instructions

## 2022-01-01 NOTE — ED PROVIDER NOTE - PATIENT PORTAL LINK FT
You can access the FollowMyHealth Patient Portal offered by [facility] by registering at the following website: [email or web address]. By joining [facility]’s FollowMyHealth portal, you will also be able to view your health information using other applications (apps) compatible with our system.

## 2022-09-30 NOTE — H&P ADULT - PROBLEM SELECTOR PLAN 3
will continue metoprolol and  Nifedipine   will continue to monitor rate and adjust meds as needed  continue coumadin for AC

## 2022-09-30 NOTE — H&P ADULT - ASSESSMENT
89 yo woman presents with recently diagnosed Covid 19. Patient found to be hypoxic and will be admit tied for further treatment

## 2022-09-30 NOTE — ED PROVIDER NOTE - PROGRESS NOTE DETAILS
Labs/imaging reviewed with pt. Spoke to Dr. Wu - recommended admission to him, accepted pt. Pt aware of admission.   - Jean Paul Garcia PA-C

## 2022-09-30 NOTE — PATIENT PROFILE ADULT - HAS THE PATIENT RECEIVED THE INFLUENZA VACCINE THIS SEASON?
no... Ear Star Wedge Flap Text: The defect edges were debeveled with a #15 blade scalpel.  Given the location of the defect and the proximity to free margins (helical rim) an ear star wedge flap was deemed most appropriate.  Using a sterile surgical marker, the appropriate flap was drawn incorporating the defect and placing the expected incisions between the helical rim and antihelix where possible.  The area thus outlined was incised through and through with a #15 scalpel blade.

## 2022-09-30 NOTE — H&P ADULT - NSHPPHYSICALEXAM_GEN_ALL_CORE
PHYSICAL EXAM:  GENERAL: NAD, cachectic   HEAD:  Atraumatic  EYES: EOM, PERRLA, conjunctiva pink and sclera white  ENT: No tonsillar erythema, exudates, or enlargement, moist mucous membranes, good dentition, no lesions  NECK: Supple, No JVD, normal thyroid, carotids with normal upstrokes and no bruits  CHEST/LUNG: Clear to auscultation bilaterally, No rales, rhonchi, wheezing, or rubs  HEART: Regular rate and rhythm, No murmurs, rubs, or gallops  ABDOMEN: Soft, nondistended, no masses, guarding, tenderness or rebound, bowel sounds present  EXTREMITIES:  2+ Peripheral Pulses, No clubbing, cyanosis, or edema.   LYMPH: No lymphadenopathy noted  SKIN: No rashes or lesions  NERVOUS SYSTEM:  Alert & Oriented X2-3, normal cognitive function. Motor Strength 5/5 right upper and right lower.  5/5 left upper and left lower extremities, DTRs 2+ intact and symmetric

## 2022-09-30 NOTE — H&P ADULT - NSHPREVIEWOFSYSTEMS_GEN_ALL_CORE
REVIEW OF SYSTEMS:  CONSTITUTIONAL: No fever, change in weight, or fatigue  HEAD: No headache, dizziness or recent trauma  EYES: No eye pain, visual disturbances, or discharge  ENT:  No difficulty hearing, tinnitus, vertigo, No sinus or throat pain  NECK: No pain or stiffness  BREASTS: No pain, masses, or nipple discharge  RESPIRATORY: + cough and SOB  CARDIOVASCULAR: No chest pain, palpitations, dizziness, CHF, arrhythmia, cardiomegaly or leg swelling  GASTROINTESTINAL: No abdominal or epigastric pain. No nausea, vomiting, or hematemesis, No diarrhea or constipation. No melena or hematochezia.  GENITOURINARY: No dysuria, frequency, hematuria, or incontinence  SKIN: No itching, burning, rashes, or lesions   LYMPH NODES: No history of enlarged glands  ENDOCRINE: No heat or cold intolerance, No hair loss. No osteoporosis or thyroid disease  MUSCULOSKELETAL: No joint pain or swelling, No muscle, back, or extremity pain  PSYCHIATRIC: No depression, anxiety, mood swings, or difficulty sleeping  HEME/LYMPH: No easy bruising, anticoagulants, bleeding disorder or bleeding gums  ALLERGY AND IMMUNOLOGIC: No hives or eczema  NEUROLOGICAL: No memory loss, loss of strength, numbness, or tremors

## 2022-09-30 NOTE — H&P ADULT - HISTORY OF PRESENT ILLNESS
89 yo female with PMHx of AS, AFib (on warfarin), CHF s/p pacemaker, HTN, HLD presented for intermittent dyspnea x 4 days iso recent COVID infection (tested positive on 9/25/22). Pt endorsed generalized fatigue/malaise, lack of appetite, cough, intermittent SOB. Denied chest pain, fevers, chills, abdominal pain, dysuria. Patient was brought to [facility] for further evaluation and treatment. In the ED the Patient was found to have O2 sats in the 80's which improved with supplemental O2. Patient seen now resting comfortably

## 2022-09-30 NOTE — ED PROVIDER NOTE - NS ED ATTENDING STATEMENT MOD
This was a shared visit with the V[facility]. I reviewed and verified the documentation and independently performed the documented:

## 2022-09-30 NOTE — ED PROVIDER NOTE - PHYSICAL EXAMINATION
CONSTITUTIONAL: Patient is awake, alert and oriented x 3. Patient is in no acute distress.  HEAD: NC/AT  EYES: PERRL b/l, EOMI, sclera non-icteric  ENT: Airway patent, nasal mucosa clear, mouth with normal mucosa. Throat has no vesicles, no oropharyngeal exudates and uvula is midline.  NECK: supple, no LAD  LUNGS: CTAB, no increased WOB, no wheezing/rales appreciated  HEART: RRR.+S1S2, no murmurs appreciated  ABDOMEN: Soft, non-distended, non-tender  EXTREMITY: WWP, no edema or calf tenderness b/l  SKIN: No rash or lesions appreciated CONSTITUTIONAL: Patient is awake, alert and oriented x 3. Patient is in no acute distress.  HEAD: NC/AT  EYES: PERRL b/l, EOMI, sclera non-icteric  ENT: Airway patent, nasal mucosa clear, mouth with normal mucosa. Throat has no vesicles, no oropharyngeal exudates and uvula is midline.  NECK: supple, no LAD  LUNGS: CTAB, no increased WOB, no wheezing/rales appreciated  HEART: RRR. +3/6 systolic murmur  ABDOMEN: Soft, non-distended, non-tender  EXTREMITY: WWP, no edema or calf tenderness b/l  SKIN: No rash or lesions appreciated

## 2022-09-30 NOTE — ED ADULT NURSE NOTE - OBJECTIVE STATEMENT
89 y f came to the ed by ems for c/o sob and cough. states the symptoms started about 3 days ago and she was diagnosed with covid-19. states her sob is worse when she ambulates. patient is a/ox3. denies fevers, chills, chest pain. abdomen is soft and nontender. skin is warm and dry.

## 2022-09-30 NOTE — ED PROVIDER NOTE - ATTENDING APP SHARED VISIT CONTRIBUTION OF CARE
Attending (Fili Montoya M.D.):  I have personally seen and examined this patient. I have performed a substantive portion of the visit including all aspects of the medical decision making. Resident and/or ACP note reviewed. I agree on the plan of care except where noted.

## 2022-09-30 NOTE — ED PROVIDER NOTE - NS ED MD DISPO ADMITTING SERVICE
Patient : Carol Nova Age: 81 year old Sex: female   MRN: [id number] Encounter Date: 1/6/2021    1B/B01B    History     Chief Complaint   Patient presents with   • Chest Pain Adult   • Shortness of Breath   • Weakness     HPI  1/6/2021  9:32 AM Carol Nova is a 81 year old female who presents to the ED for evaluation of *** that began ***.         There are no further complaints or modifying factors at this time.    PCP: Ashley Leyva MD    Allergies   Allergen Reactions   • Ace Inhibitors Other (See Comments)     cough   • Ezetimibe-Simvastatin NAUSEA   • Lisinopril HEADACHES, DIARRHEA and PRURITUS   • Statins GI UPSET       Current Discharge Medication List      Prior to Admission Medications    Details   sildenafil (REVATIO) 20 MG tablet Take 1 tablet by mouth 3 times daily.  Qty: 90 tablet, Refills: 3      potassium chloride (KLOR-CON M) 10 MEQ yeison ER tablet Take 3 tablets by mouth every morning AND 3 tablets daily (at noon) AND 3 tablets every evening.  Qty: 810 tablet, Refills: 3      metOLazone (ZAROXOLYN) 2.5 MG tablet Take 1 tablet by mouth every other day.  Qty: 45 tablet, Refills: 3      torsemide (DEMADEX) 20 MG tablet Take 3 tablets by mouth every morning AND 2 tablets daily (before lunch).  Qty: 450 tablet, Refills: 3      glipiZIDE (GLUCOTROL) 10 MG tablet TAKE 1 TABLET TWICE DAILY BEFORE MEALS  Qty: 180 tablet, Refills: 1      pantoprazole (PROTONIX) 40 MG tablet TAKE 1 TABLET EVERY DAY  Qty: 90 tablet, Refills: 1      escitalopram (LEXAPRO) 10 MG tablet TAKE 1 TABLET EVERY DAY  Qty: 90 tablet, Refills: 1      metFORMIN (GLUCOPHAGE) 500 MG tablet TAKE 2 TABLETS TWICE DAILY WITH MEALS  Qty: 360 tablet, Refills: 1      metoPROLOL tartrate (LOPRESSOR) 50 MG tablet Take 1 tablet by mouth 2 times daily.  Qty: 180 tablet, Refills: 1      digoxin (LANOXIN) 0.125 MG tablet Take 1 tablet by mouth every other day.      Lancets (accu-chek safe-t pro) Misc Check BS once a day  Qty: 200 each, Refills: 11       blood glucose (Prodigy No Coding Blood Gluc) test strip Test blood sugar one times daily as directed. Diagnosis:  DM . Meter: glucometer  Qty: 200 each, Refills: 11      dilTIAZem (CARDIZEM CD) 240 MG 24 hr capsule Take 1 capsule by mouth daily.  Qty: 90 capsule, Refills: 3      Multiple Vitamins-Minerals (EYE VITAMINS PO) Take by mouth daily. 2 gummy eye vitamins daily      acetaminophen (TYLENOL) 500 MG tablet Take 1,000 mg by mouth every 6 hours as needed for Pain. Usually takes at night      ferrous sulfate (FERROUSUL) 325 (65 FE) MG tablet Take 325 mg by mouth daily.       polyethylene glycol (MIRALAX) 17 g packet Take 17 g by mouth daily as needed. Stir and dissolve powder in any 4 to 8 ounces of beverage, then drink.      Omega-3 Fatty Acids (FISH OIL) 1000 MG capsule Take 2 g by mouth daily.      Multiple Vitamins-Minerals (MULTIVITAMIN GUMMIES ADULT PO) Take 2 tablets by mouth every morning.       Cholecalciferol (VITAMIN D3) 2000 UNITS CHEW Chew 2 tablets by mouth every morning. Gummie chewable       aspirin 81 MG tablet Take 81 mg by mouth every morning.              Past Medical History:   Diagnosis Date   • Alcoholism /alcohol abuse (CMS/HCC)    • Anxiety    • Aortic stenosis     S/P AVR   • Atrial fibrillation (CMS/HCC)    • Benign positional vertigo    • Bilateral age-related macular degeneration     gets around OK but has difficulty reading   • Bilateral carotid artery stenosis    • Colon polyps     tubular adenoma   • Congestive cardiac failure (CMS/HCC)    • Coronary artery disease    • Depression    • Diverticulosis    • Gastroesophageal reflux disease    • GI bleed 08/2017    while on Warfarin   • Hyperlipidemia    • Lung nodule    • Malignant neoplasm of breast (female), unspecified site 2009    Stage I left breast invasive ductal carcinoma, 1.1 cm, ER/WY positive, HER-2/obie negative, 5 lymph nodes negative for metastatic disease, s/p lumpectomy July 20, 2009 with intermediate Onco Dx score.       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[facility])    • Mitral regurgitation     mild-moderate   • Murmur, heart    • Neuropathy of both feet     Uses Tylenol   • Non-ischemic cardiomyopathy (CMS/HCC) 2020    EF 54%   • Pacemaker 2012    Medtronic   • Pleural effusion on right 05/2020    chest tube   • Pneumonia    • SSS (sick sinus syndrome) (CMS/[facility])    • SUPRAVENTRICULAR TACHYCARDIA    • Thyroid nodule    • Type II or unspecified type diabetes mellitus without mention of complication, not stated as uncontrolled    • Unspecified essential hypertension    • Wears dentures     full upper   • Wears glasses    • Wears partial dentures     upper denture       Past Surgical History:   Procedure Laterality Date   • ARTERY SURGERY  08/22/2012    mesenteric artery ischemia stent placed   • BREAST SURGERY Left 07/20/2009    lumpectomy   • CARDIAC CATHERIZATION  12/09/2016    50% p LAD   • CARDIAC SURGERY  08/08/2014    AVR  tissue   • COLONOSCOPY W/ POLYPECTOMY  03/2007    polyps (tubular adenoma), diverticulosis   • COLONOSCOPY W/ POLYPECTOMY  08/15/2012    polyps (tubular adenoma), diverticulosis   • COLONOSCOPY W/ POLYPECTOMY  05/16/2017    polyps (tubular adenoma), diverticulosis   • CT GUIDED CHEST TUBE INSERTION  05/2020    Right pleural effusion   • CT SALIVARY GLANDS COMBO  01/01/1989    Left submandibular gland excision   • EYE SURGERY Left 10/08/2012    vitrectomy with membrane peel   • EYE SURGERY Right 11/27/2012    vitrectomy with membrane peel   • EYE SURGERY Left 07/20/2011    cataract extraction with IOL implant   • EYE SURGERY Right 10/12/2011    cataract extraction with IOL implant   • HYSTERECTOMY  01/01/1980    one ovary left   • PA PRESSURE SENSOR IMPLANT - CV  08/12/2020   • PACEMAKER IMPLANT  09/17/2012    Medtronic for SSS   • TONSILLECTOMY      with adnoids as a child   • US GUIDE THYROID BIOPSY         Family History   Problem Relation Age of Onset   • Neurological Disorder Mother         Alzheimer's    • Cancer Father         Lung cancer   • Cancer Sister         Melanoma   • Cancer Sister         Mesothelioma   • Heart Brother         AMI - half brother       Social History     Tobacco Use   • Smoking status: Never Smoker   • Smokeless tobacco: Never Used   Substance Use Topics   • Alcohol use: Yes     Alcohol/week: 2.0 - 3.0 standard drinks     Types: 2 - 3 Glasses of wine per week     Frequency: Monthly or less     Drinks per session: 1 or 2     Binge frequency: Never     Comment: alcohol abuse prior to May 2012   • Drug use: No       E-cigarette/Vaping   • E-Cigarette/Vaping Use Never Used      E-Cigarette/Vaping Substances & Devices       Review of Systems    Physical Exam     ED Triage Vitals   ED Triage Vitals Group      Temp       Pulse       Resp       BP       SpO2       EtCO2 mmHg       Height       Weight       Weight Scale Used       BMI (Calculated)       IBW/kg (Calculated)        Physical Exam    ED Course     Procedures    Lab Results     Results for orders placed or performed during the hospital encounter of 01/06/21   ISTAT8 VENOUS  POINT OF CARE   Result Value Ref Range    BUN - POINT OF CARE >120 (H) 6 - 20 mg/dL    SODIUM - POINT OF CARE 128 (L) 135 - 145 mmol/L    POTASSIUM - POINT OF CARE 2.9 (L) 3.4 - 5.1 mmol/L    CHLORIDE - POINT OF CARE 84 (L) 98 - 107 mmol/L    TCO2 - POINT OF CARE 29 (H) 19 - 24 mmol/L    ANION GAP - POINT OF CARE 18 10 - 20 mmol/L    HEMATOCRIT - POINT OF CARE 36.0 36.0 - 46.5 %    HEMOGLOBIN - POINT OF CARE 12.2 12.0 - 15.5 g/dL    GLUCOSE - POINT OF CARE 241 (H) 70 - 99 mg/dL    CALCIUM, IONIZED - POINT OF CARE 1.18 1.15 - 1.29 mmol/L    Creatinine 1.60 (H) 0.51 - 0.95 mg/dL    Glomerular Filtration Rate 30 (L) >90 mL/min/1.73m2       EKG Results     EKG Interpretation  Rate: ***  Rhythm: {rhythm:91615}   Abnormality: {ABNORMAL:588882}    EKG tracing interpreted by ED physician    Radiology Results     Imaging Results    None         ED Medication Orders (From  admission, onward)    None               MDM  Vitals  Vitals:    01/06/21 0930   BP: 91/56   BP Location: RUE - Right upper extremity   Patient Position: Semi-Hutchison's   Pulse: 73   Resp: 18   Temp: 97.7 °F (36.5 °C)   TempSrc: Oral   SpO2: 91%   Weight: 51.8 kg   Height: 5' 5\" (1.651 m)       ED Course      MDM  Critical Care time spent on this patient outside of billable procedures:  {ED CRITICAL CARE TIMES:016267}        I have reviewed the information recorded by the scribe for accuracy and agree with its contents.    ____________________________________________________________________    Cj Lopez acting as a scribe for Dr. Joseph Marx  Dictation # [id number]  Scribe: Cj Lopez               MED

## 2022-09-30 NOTE — H&P ADULT - NSHPLABSRESULTS_GEN_ALL_CORE
13.3   5.54  )-----------( 208      ( 30 Sep 2022 13:51 )             41.7       09-30    138  |  105  |  21  ----------------------------<  84  5.3   |  21<L>  |  0.98    Ca    9.0      30 Sep 2022 13:51  Phos  4.4     09-30  Mg     1.8     09-30    TPro  6.9  /  Alb  3.7  /  TBili  0.8  /  DBili  x   /  AST  36  /  ALT  17  /  AlkPhos  92  09-30                      Lactate Trend            CAPILLARY BLOOD GLUCOSE

## 2022-09-30 NOTE — ED PROVIDER NOTE - OBJECTIVE STATEMENT
89 yo female with PMHx of AS, AFib (on warfarin), CHF s/p pacemaker, HTN, HLD presented for intermittent dyspnea x 4 days iso recent COVID infection (tested positive on 9/25/22). Pt endorsed generalized fatigue/malaise, lack of appetite, cough, intermittent SOB. Denied chest pain, fevers, chills, abdominal pain, dysuria.

## 2022-09-30 NOTE — ED PROVIDER NOTE - CLINICAL SUMMARY MEDICAL DECISION MAKING FREE TEXT BOX
89 yo female with PMHx of AS, AFib (on warfarin), CHF s/p pacemaker, HTN, HLD presented for intermittent dyspnea x 4 days iso recent COVID infection (tested positive on 9/25/22). Pt endorsed generalized fatigue/malaise, lack of appetite, cough, intermittent SOB. VS - satting *** 89 yo female with PMHx of AS, AFib (on warfarin), CHF s/p pacemaker, HTN, HLD presented for intermittent dyspnea x 4 days iso recent COVID infection (tested positive on 9/25/22). Pt endorsed generalized fatigue/malaise, lack of appetite, cough, intermittent SOB. VS - satting 95-97% on 2L NC, on exam - lungs CTAB, +3/6 systolic murmur. Will get labs, CXR, given high risk with dyspnea - will give steroids, likely admit

## 2022-09-30 NOTE — H&P ADULT - PROBLEM SELECTOR PLAN 4
continue to monitor  No syncope or chest pain  follow for fluid overload  avoid fluid shifts  would use diuretic judiciously

## 2022-09-30 NOTE — H&P ADULT - PROBLEM SELECTOR PLAN 1
with hypoxia will start Patient on a course of decadron and remdesivir   CXR was unremarkable  continue to monitor labs and glucos

## 2022-09-30 NOTE — PATIENT PROFILE ADULT - FALL HARM RISK - HARM RISK INTERVENTIONS

## 2022-10-01 NOTE — PROGRESS NOTE ADULT - PROBLEM SELECTOR PLAN 4
continue to monitor  No syncope or chest pain  follow for fluid overload  avoid fluid shifts  would use diuretic judiciously -c/w metoprolol and Nifedipine  -c/w continue to monitor rate and adjust meds as needed  -continue coumadin for AC (2.5 nightly home dose per pt)

## 2022-10-01 NOTE — PROGRESS NOTE ADULT - PROBLEM SELECTOR PLAN 3
will continue metoprolol and  Nifedipine   will continue to monitor rate and adjust meds as needed  continue coumadin for AC UA was nitrite positive with moderate bacteria; pt describes no increased frequency or dysuria.   -Urine cx ordered

## 2022-10-01 NOTE — PHYSICAL THERAPY INITIAL EVALUATION ADULT - ADDITIONAL COMMENTS
as per pt: Pt was living at an assisted living facility, ambulatory with no AD. able to take care of herself with all BADLs, IADL's assisted by facility.

## 2022-10-01 NOTE — PROGRESS NOTE ADULT - SUBJECTIVE AND OBJECTIVE BOX
89 yo female with PMHx of AS, AFib (on warfarin), CHF s/p pacemaker, HTN, HLD presented for intermittent dyspnea x 4 days iso recent COVID infection (tested positive on 22). Pt endorsed generalized fatigue/malaise, lack of appetite, cough, intermittent SOB. Denied chest pain, fevers, chills, abdominal pain, dysuria. Patient was brought to [facility] for further evaluation and treatment. In the ED the Patient was found to have O2 sats in the 80's which improved with supplemental O2. Patient seen now resting comfortably      MEDICATIONS  (STANDING):  atorvastatin 20 milliGRAM(s) Oral at bedtime  chlorhexidine 2% Cloths 1 Application(s) Topical daily  cholecalciferol 1000 Unit(s) Oral daily  dexAMETHasone     Tablet 6 milliGRAM(s) Oral daily  influenza  Vaccine (HIGH DOSE) 0.7 milliLiter(s) IntraMuscular once  metoprolol tartrate 25 milliGRAM(s) Oral two times a day  NIFEdipine XL 30 milliGRAM(s) Oral daily  remdesivir  IVPB   IV Intermittent   remdesivir  IVPB 100 milliGRAM(s) IV Intermittent every 24 hours  warfarin 2.5 milliGRAM(s) Oral once    MEDICATIONS  (PRN):          VITALS:   T(C): 36.4 (10-01-22 @ 13:57), Max: 37.1 (22 @ 18:47)  HR: 62 (10-01-22 @ 13:57) (58 - 74)  BP: 108/62 (10-01-22 @ 13:57) (103/62 - 124/71)  RR: 16 (10-01-22 @ 13:57) (16 - 20)  SpO2: 96% (10-01-22 @ 13:57) (93% - 97%)  Wt(kg): --    PHYSICAL EXAM:  GENERAL: NAD, cachectic   HEAD:  Atraumatic  EYES: EOM, PERRLA, conjunctiva pink and sclera white  ENT: No tonsillar erythema, exudates, or enlargement, moist mucous membranes, good dentition, no lesions  NECK: Supple, No JVD, normal thyroid, carotids with normal upstrokes and no bruits  CHEST/LUNG: Clear to auscultation bilaterally, No rales, rhonchi, wheezing, or rubs  HEART: Regular rate and rhythm, No murmurs, rubs, or gallops  ABDOMEN: Soft, nondistended, no masses, guarding, tenderness or rebound, bowel sounds present  EXTREMITIES:  2+ Peripheral Pulses, No clubbing, cyanosis, or edema.   LYMPH: No lymphadenopathy noted  SKIN: No rashes or lesions  NERVOUS SYSTEM:  Alert & Oriented X2-3, normal cognitive function. Motor Strength 5/5 right upper and right     LABS:        CBC Full  -  ( 30 Sep 2022 13:51 )  WBC Count : 5.54 K/uL  RBC Count : 4.15 M/uL  Hemoglobin : 13.3 g/dL  Hematocrit : 41.7 %  Platelet Count - Automated : 208 K/uL  Mean Cell Volume : 100.5 fl  Mean Cell Hemoglobin : 32.0 pg  Mean Cell Hemoglobin Concentration : 31.9 gm/dL  Auto Neutrophil # : 3.19 K/uL  Auto Lymphocyte # : 1.65 K/uL  Auto Monocyte # : 0.59 K/uL  Auto Eosinophil # : 0.06 K/uL  Auto Basophil # : 0.02 K/uL  Auto Neutrophil % : 57.6 %  Auto Lymphocyte % : 29.8 %  Auto Monocyte % : 10.6 %  Auto Eosinophil % : 1.1 %  Auto Basophil % : 0.4 %    10-01    x   |  x   |  x   ----------------------------<  x   x    |  x   |  1.09    Ca    9.0      30 Sep 2022 13:51  Phos  4.4     09-30  Mg     1.8     09-30    TPro  6.2  /  Alb  3.4  /  TBili  0.5  /  DBili  0.2  /  AST  19  /  ALT  14  /  AlkPhos  85  10-01    LIVER FUNCTIONS - ( 01 Oct 2022 07:03 )  Alb: 3.4 g/dL / Pro: 6.2 g/dL / ALK PHOS: 85 U/L / ALT: 14 U/L / AST: 19 U/L / GGT: x           PT/INR - ( 01 Oct 2022 07:03 )   PT: 36.5 sec;   INR: 3.14 ratio           Urinalysis Basic - ( 01 Oct 2022 06:56 )    Color: Yellow / Appearance: Clear / S.027 / pH: x  Gluc: x / Ketone: Negative  / Bili: Negative / Urobili: Negative   Blood: x / Protein: 30 mg/dL / Nitrite: Positive   Leuk Esterase: Small / RBC: 2 /hpf / WBC 12 /HPF   Sq Epi: x / Non Sq Epi: 3 /hpf / Bacteria: Moderate      CAPILLARY BLOOD GLUCOSE          RADIOLOGY & ADDITIONAL TESTS:

## 2022-10-01 NOTE — PROGRESS NOTE ADULT - PROBLEM SELECTOR PLAN 1
with hypoxia will start Patient on a course of decadron and remdesivir   CXR was unremarkable  continue to monitor labs and glucose

## 2022-10-01 NOTE — PROGRESS NOTE ADULT - PROBLEM SELECTOR PLAN 2
continue supplemental O2  will continue to monitor O2 sats  pulmonary eval as needed  O2 sats have improved  wean off O2 as tolerated

## 2022-10-01 NOTE — PROGRESS NOTE ADULT - PROBLEM SELECTOR PLAN 1
with hypoxia will start Patient on a course of decadron and remdesivir   CXR was unremarkable  continue to monitor labs and glucos with hypoxia will start Patient on a course of decadron and remdesivir day 2  -CXR was unremarkable  -continue to monitor labs and glucose  -AM d-dimer

## 2022-10-01 NOTE — PHYSICAL THERAPY INITIAL EVALUATION ADULT - PERTINENT HX OF CURRENT PROBLEM, REHAB EVAL
89y F with hx of afib on coumadin presenting for lethargy, fatigue, SOB, found to be COVID+ and hypoxemic, admitted for further management.    CXR: IMPRESSION:Trace right pleural effusion.No focal infiltrates.

## 2022-10-01 NOTE — PROGRESS NOTE ADULT - ASSESSMENT
89 yo woman presents with recently diagnosed Covid 19. Patient found to be hypoxic and will be admit tied for further treatment  89 yo woman presents with recently diagnosed Covid 19. Patient found to be hypoxic and will be admitted for further treatment

## 2022-10-01 NOTE — PROGRESS NOTE ADULT - SUBJECTIVE AND OBJECTIVE BOX
Hillary Sanders MD  PGY 1 Department of Internal Medicine        Patient is a 89y old  Female who presents with a chief complaint of Covid with hypoxia (30 Sep 2022 15:26)      SUBJECTIVE / OVERNIGHT EVENTS: Pt seen and examined. No acute overnight events. Denies fevers, chills, CP, SOB, Abdominal pain, N/V, Constipation, Diarrhea        MEDICATIONS  (STANDING):  atorvastatin 20 milliGRAM(s) Oral at bedtime  chlorhexidine 2% Cloths 1 Application(s) Topical daily  cholecalciferol 1000 Unit(s) Oral daily  dexAMETHasone     Tablet 6 milliGRAM(s) Oral daily  influenza  Vaccine (HIGH DOSE) 0.7 milliLiter(s) IntraMuscular once  metoprolol tartrate 25 milliGRAM(s) Oral two times a day  NIFEdipine XL 30 milliGRAM(s) Oral daily  remdesivir  IVPB   IV Intermittent   remdesivir  IVPB 100 milliGRAM(s) IV Intermittent every 24 hours    MEDICATIONS  (PRN):      I&O's Summary    30 Sep 2022 07:01  -  01 Oct 2022 07:00  --------------------------------------------------------  IN: 250 mL / OUT: 250 mL / NET: 0 mL        Vital Signs Last 24 Hrs  T(C): 36.6 (01 Oct 2022 05:18), Max: 37.1 (30 Sep 2022 18:47)  T(F): 97.8 (01 Oct 2022 05:18), Max: 98.7 (30 Sep 2022 18:47)  HR: 74 (01 Oct 2022 05:18) (58 - 74)  BP: 109/70 (01 Oct 2022 05:18) (103/62 - 124/71)  BP(mean): --  RR: 18 (01 Oct 2022 05:18) (18 - 20)  SpO2: 93% (01 Oct 2022 05:18) (93% - 97%)    Parameters below as of 01 Oct 2022 05:18  Patient On (Oxygen Delivery Method): room air        CAPILLARY BLOOD GLUCOSE          PHYSICAL EXAM:  GENERAL: NAD,   HEAD:  Atraumatic, Normocephalic  EYES: EOMI, PERRL, conjunctiva and sclera clear  NECK: No JVD  CHEST/LUNG: Clear to auscultation bilaterally; No wheeze  HEART: Regular rate and rhythm; No murmurs, rubs, or gallops  ABDOMEN: Soft, Nontender, Nondistended; Bowel sounds present  EXTREMITIES:  2+ Peripheral Pulses, No clubbing, cyanosis, or edema  PSYCH: AAOx3  NEUROLOGY: non-focal  SKIN: No rashes or lesions       LABS:                        13.3   5.54  )-----------( 208      ( 30 Sep 2022 13:51 )             41.7     Auto Eosinophil # 0.06  / Auto Eosinophil % 1.1   / Auto Neutrophil # 3.19  / Auto Neutrophil % 57.6  / BANDS % x        10-01    x   |  x   |  x   ----------------------------<  x   x    |  x   |  1.09  -30    138  |  105  |  21  ----------------------------<  84  5.3   |  21<L>  |  0.98    Ca    9.0      30 Sep 2022 13:51  Mg     1.8       Phos  4.4     -30  TPro  6.2  /  Alb  3.4  /  TBili  0.5  /  DBili  0.2  /  AST  19  /  ALT  14  /  AlkPhos  85  10-01  TPro  6.9  /  Alb  3.7  /  TBili  0.8  /  DBili  x   /  AST  36  /  ALT  17  /  AlkPhos  92  09-30    PT/INR - ( 01 Oct 2022 07:03 )   PT: 36.5 sec;   INR: 3.14 ratio               Urinalysis Basic - ( 01 Oct 2022 06:56 )    Color: Yellow / Appearance: Clear / S.027 / pH: x  Gluc: x / Ketone: Negative  / Bili: Negative / Urobili: Negative   Blood: x / Protein: 30 mg/dL / Nitrite: Positive   Leuk Esterase: Small / RBC: 2 /hpf / WBC 12 /HPF   Sq Epi: x / Non Sq Epi: 3 /hpf / Bacteria: Moderate            RADIOLOGY & ADDITIONAL TESTS:    Imaging Personally Reviewed:    Consultant(s) Notes Reviewed:      Care Discussed with Consultants/Other Providers:   Hillary Sanders MD  PGY 1 Department of Internal Medicine        Patient is a 89y old  Female who presents with a chief complaint of Covid with hypoxia (30 Sep 2022 15:26)      SUBJECTIVE / OVERNIGHT EVENTS: Pt seen and examined. No acute overnight events. Continues with dry cough but feels condition improving. Currently on 2LNC satting well. Denies fevers, chills, CP, SOB, Abdominal pain, N/V, Constipation, Diarrhea.        MEDICATIONS  (STANDING):  atorvastatin 20 milliGRAM(s) Oral at bedtime  chlorhexidine 2% Cloths 1 Application(s) Topical daily  cholecalciferol 1000 Unit(s) Oral daily  dexAMETHasone     Tablet 6 milliGRAM(s) Oral daily  influenza  Vaccine (HIGH DOSE) 0.7 milliLiter(s) IntraMuscular once  metoprolol tartrate 25 milliGRAM(s) Oral two times a day  NIFEdipine XL 30 milliGRAM(s) Oral daily  remdesivir  IVPB   IV Intermittent   remdesivir  IVPB 100 milliGRAM(s) IV Intermittent every 24 hours    MEDICATIONS  (PRN):      I&O's Summary    30 Sep 2022 07:01  -  01 Oct 2022 07:00  --------------------------------------------------------  IN: 250 mL / OUT: 250 mL / NET: 0 mL        Vital Signs Last 24 Hrs  T(C): 36.6 (01 Oct 2022 05:18), Max: 37.1 (30 Sep 2022 18:47)  T(F): 97.8 (01 Oct 2022 05:18), Max: 98.7 (30 Sep 2022 18:47)  HR: 74 (01 Oct 2022 05:18) (58 - 74)  BP: 109/70 (01 Oct 2022 05:18) (103/62 - 124/71)  BP(mean): --  RR: 18 (01 Oct 2022 05:18) (18 - 20)  SpO2: 93% (01 Oct 2022 05:18) (93% - 97%)    Parameters below as of 01 Oct 2022 05:18  Patient On (Oxygen Delivery Method): room air        CAPILLARY BLOOD GLUCOSE          PHYSICAL EXAM:  GENERAL: NAD,   HEAD:  Atraumatic, Normocephalic  EYES: EOMI, PERRL, conjunctiva and sclera clear  NECK: No JVD  CHEST/LUNG: Clear to auscultation bilaterally; No wheeze  HEART: (+) Irregular rate and rhythm; No murmurs, rubs, or gallops  ABDOMEN: Soft, Nontender, Nondistended; Bowel sounds present  EXTREMITIES:  2+ Peripheral Pulses, No clubbing, cyanosis, or edema  PSYCH: AAOx3  NEUROLOGY: non-focal  SKIN: No rashes or lesions       LABS:                        13.3   5.54  )-----------( 208      ( 30 Sep 2022 13:51 )             41.7     Auto Eosinophil # 0.06  / Auto Eosinophil % 1.1   / Auto Neutrophil # 3.19  / Auto Neutrophil % 57.6  / BANDS % x        10-01    x   |  x   |  x   ----------------------------<  x   x    |  x   |  1.09      138  |  105  |  21  ----------------------------<  84  5.3   |  21<L>  |  0.98    Ca    9.0      30 Sep 2022 13:51  Mg     1.8       Phos  4.4     -  TPro  6.2  /  Alb  3.4  /  TBili  0.5  /  DBili  0.2  /  AST  19  /  ALT  14  /  AlkPhos  85  10-  TPro  6.9  /  Alb  3.7  /  TBili  0.8  /  DBili  x   /  AST  36  /  ALT  17  /  AlkPhos  92  09-30    PT/INR - ( 01 Oct 2022 07:03 )   PT: 36.5 sec;   INR: 3.14 ratio               Urinalysis Basic - ( 01 Oct 2022 06:56 )    Color: Yellow / Appearance: Clear / S.027 / pH: x  Gluc: x / Ketone: Negative  / Bili: Negative / Urobili: Negative   Blood: x / Protein: 30 mg/dL / Nitrite: Positive   Leuk Esterase: Small / RBC: 2 /hpf / WBC 12 /HPF   Sq Epi: x / Non Sq Epi: 3 /hpf / Bacteria: Moderate            RADIOLOGY & ADDITIONAL TESTS:    Imaging Personally Reviewed:    Consultant(s) Notes Reviewed:      Care Discussed with Consultants/Other Providers:

## 2022-10-01 NOTE — PROGRESS NOTE ADULT - PROBLEM SELECTOR PLAN 2
continue supplemental O2  will continue to monitor O2 sats  pulmonary eval as needed Pt satting well (93-96%) on 2LNC  -continue supplemental O2  -will continue to monitor O2 sats  -pulmonary eval as needed

## 2022-10-02 NOTE — DIETITIAN INITIAL EVALUATION ADULT - ADD RECOMMEND
1) Recommend liberalizing DASH/TLC diet to regular diet as tolerated to promote PO intake. Defer texture/consistency to SLP PRN. Continue soft/bite sized as able per request of pt.   2) Recommend adding Ensure Plus High Protein 2x/day (nutritionally similar to Ensure Enlive - currently unavailable from ; 350 kcal and 20 g protein in each) to promote adequate PO intake.   3) Continue to monitor PO intake, weight, labs, skin, GI status, and diet.  4) RD remains available for diet education PRN.   5) Malnutrition sticker placed in chart.  1) Recommend liberalizing DASH/TLC diet to Low Sodium diet as tolerated to promote PO intake. Defer texture/consistency to SLP PRN. Continue soft/bite sized as able per request of pt.   2) Recommend adding Ensure Plus High Protein 1x/day (nutritionally similar to Ensure Enlive - currently unavailable from ; 350 kcal and 20 g protein in each) and Ensure pudding 1x/day to promote adequate PO intake.   3) Continue to monitor PO intake, weight, labs, skin, GI status, and diet.  4) RD remains available for diet education PRN.   5) Malnutrition sticker placed in chart.

## 2022-10-02 NOTE — DIETITIAN NUTRITION RISK NOTIFICATION - ADDITIONAL COMMENTS/DIETITIAN RECOMMENDATIONS
1) Recommend liberalizing DASH/TLC diet to regular diet as tolerated to promote PO intake. Defer texture/consistency to SLP PRN. Continue soft/bite sized as able per request of pt.   2) Recommend adding Ensure Plus High Protein 2x/day (nutritionally similar to Ensure Enlive - currently unavailable from ; 350 kcal and 20 g protein in each) to promote adequate PO intake. 1) Recommend liberalizing DASH/TLC diet to Low Sodium diet as tolerated to promote PO intake. Defer texture/consistency to SLP PRN. Continue soft/bite sized as able per request of pt.   2) Recommend adding Ensure Plus High Protein 1x/day (nutritionally similar to Ensure Enlive - currently unavailable from ; 350 kcal and 20 g protein in each) and Ensure pudding 1x/day to promote adequate PO intake.

## 2022-10-02 NOTE — DIETITIAN NUTRITION RISK NOTIFICATION - TREATMENT: THE FOLLOWING DIET HAS BEEN RECOMMENDED
Diet, Soft and Bite Sized:   DASH/TLC {Sodium & Cholesterol Restricted} (DASH) (09-30-22 @ 18:55) [Active]

## 2022-10-02 NOTE — DIETITIAN INITIAL EVALUATION ADULT - REASON FOR ADMISSION
Infection due to severe acute respiratory syndrome coronavirus 2 (SARS-CoV-2)    Pt is a 89 yo F admitted from assisted living for intermittent dyspnea x 4 days in setting of COVID infection.

## 2022-10-02 NOTE — PROGRESS NOTE ADULT - SUBJECTIVE AND OBJECTIVE BOX
89 yo female with PMHx of AS, AFib (on warfarin), CHF s/p pacemaker, HTN, HLD presented for intermittent dyspnea x 4 days iso recent COVID infection (tested positive on 22). Pt endorsed generalized fatigue/malaise, lack of appetite, cough, intermittent SOB. Denied chest pain, fevers, chills, abdominal pain, dysuria. Patient was brought to [facility] for further evaluation and treatment. In the ED the Patient was found to have O2 sats in the 80's which improved with supplemental O2. Patient seen now resting comfortably. Has been off O2 and sats have been stable.       MEDICATIONS  (STANDING):  atorvastatin 20 milliGRAM(s) Oral at bedtime  chlorhexidine 2% Cloths 1 Application(s) Topical daily  cholecalciferol 1000 Unit(s) Oral daily  dexAMETHasone     Tablet 6 milliGRAM(s) Oral daily  influenza  Vaccine (HIGH DOSE) 0.7 milliLiter(s) IntraMuscular once  metoprolol tartrate 25 milliGRAM(s) Oral two times a day  NIFEdipine XL 30 milliGRAM(s) Oral daily  remdesivir  IVPB   IV Intermittent   remdesivir  IVPB 100 milliGRAM(s) IV Intermittent every 24 hours    MEDICATIONS  (PRN):          VITALS:   T(C): 36.4 (10-02-22 @ 11:59), Max: 36.7 (10-01-22 @ 21:46)  HR: 64 (10-02-22 @ 11:59) (60 - 64)  BP: 111/71 (10-02-22 @ 11:59) (103/67 - 116/66)  RR: 18 (10-02-22 @ 11:59) (16 - 20)  SpO2: 98% (10-02-22 @ 11:59) (95% - 98%)  Wt(kg): --    PHYSICAL EXAM:  GENERAL: NAD, cachectic   HEAD:  Atraumatic  EYES: EOM, PERRLA, conjunctiva pink and sclera white  ENT: No tonsillar erythema, exudates, or enlargement, moist mucous membranes, good dentition, no lesions  NECK: Supple, No JVD, normal thyroid, carotids with normal upstrokes and no bruits  CHEST/LUNG: Clear to auscultation bilaterally, No rales, rhonchi, wheezing, or rubs  HEART: Regular rate and rhythm, No murmurs, rubs, or gallops  ABDOMEN: Soft, nondistended, no masses, guarding, tenderness or rebound, bowel sounds present  EXTREMITIES:  2+ Peripheral Pulses, No clubbing, cyanosis, or edema.   LYMPH: No lymphadenopathy noted  SKIN: No rashes or lesions  NERVOUS SYSTEM:  Alert & Oriented X2-3, normal cognitive function. Motor Strength 5/5 right upper and right     LABS:        CBC Full  -  ( 02 Oct 2022 11:05 )  WBC Count : 7.22 K/uL  RBC Count : 3.90 M/uL  Hemoglobin : 12.6 g/dL  Hematocrit : 37.8 %  Platelet Count - Automated : 228 K/uL  Mean Cell Volume : 96.9 fl  Mean Cell Hemoglobin : 32.3 pg  Mean Cell Hemoglobin Concentration : 33.3 gm/dL  Auto Neutrophil # : x  Auto Lymphocyte # : x  Auto Monocyte # : x  Auto Eosinophil # : x  Auto Basophil # : x  Auto Neutrophil % : x  Auto Lymphocyte % : x  Auto Monocyte % : x  Auto Eosinophil % : x  Auto Basophil % : x    10-02    134<L>  |  101  |  38<H>  ----------------------------<  144<H>  4.6   |  21<L>  |  1.00    Ca    8.9      02 Oct 2022 11:05  Phos  2.7     10-02  Mg     1.8     10-02    TPro  6.6  /  Alb  3.9  /  TBili  0.4  /  DBili  x   /  AST  21  /  ALT  15  /  AlkPhos  84  10-02    LIVER FUNCTIONS - ( 02 Oct 2022 11:05 )  Alb: 3.9 g/dL / Pro: 6.6 g/dL / ALK PHOS: 84 U/L / ALT: 15 U/L / AST: 21 U/L / GGT: x           PT/INR - ( 02 Oct 2022 11:05 )   PT: 40.6 sec;   INR: 3.49 ratio         PTT - ( 02 Oct 2022 11:05 )  PTT:40.4 sec  Urinalysis Basic - ( 01 Oct 2022 06:56 )    Color: Yellow / Appearance: Clear / S.027 / pH: x  Gluc: x / Ketone: Negative  / Bili: Negative / Urobili: Negative   Blood: x / Protein: 30 mg/dL / Nitrite: Positive   Leuk Esterase: Small / RBC: 2 /hpf / WBC 12 /HPF   Sq Epi: x / Non Sq Epi: 3 /hpf / Bacteria: Moderate      CAPILLARY BLOOD GLUCOSE          RADIOLOGY & ADDITIONAL TESTS:

## 2022-10-02 NOTE — PROGRESS NOTE ADULT - PROBLEM SELECTOR PLAN 1
Patient on a course of decadron and remdesivir   Will finish redesivir tomorrow  continue decadron   continue to monitor labs and glucose

## 2022-10-02 NOTE — DIETITIAN INITIAL EVALUATION ADULT - ORAL NUTRITION SUPPLEMENTS
Recommend adding Ensure Plus High Protein 2x/day (nutritionally similar to Ensure Enlive - currently unavailable from ; 350 kcal and 20 g protein in each) to promote adequate PO intake.

## 2022-10-02 NOTE — DIETITIAN INITIAL EVALUATION ADULT - REASON INDICATOR FOR ASSESSMENT
Pt consulted for decreased oral intake > 3 days PTA and unintentional weight loss PTA.   Source: Pt, EMR. Charts reviewed, events noted.

## 2022-10-02 NOTE — DIETITIAN INITIAL EVALUATION ADULT - PERTINENT MEDS FT
MEDICATIONS  (STANDING):  atorvastatin 20 milliGRAM(s) Oral at bedtime  chlorhexidine 2% Cloths 1 Application(s) Topical daily  cholecalciferol 1000 Unit(s) Oral daily  dexAMETHasone     Tablet 6 milliGRAM(s) Oral daily  influenza  Vaccine (HIGH DOSE) 0.7 milliLiter(s) IntraMuscular once  metoprolol tartrate 25 milliGRAM(s) Oral two times a day  NIFEdipine XL 30 milliGRAM(s) Oral daily  remdesivir  IVPB   IV Intermittent   remdesivir  IVPB 100 milliGRAM(s) IV Intermittent every 24 hours    MEDICATIONS  (PRN):

## 2022-10-02 NOTE — DIETITIAN INITIAL EVALUATION ADULT - PERTINENT LABORATORY DATA
10-01    x   |  x   |  x   ----------------------------<  x   x    |  x   |  1.09    Ca    9.0      30 Sep 2022 13:51  Phos  4.4     09-30  Mg     1.8     09-30    TPro  6.2  /  Alb  3.4  /  TBili  0.5  /  DBili  0.2  /  AST  19  /  ALT  14  /  AlkPhos  85  10-01

## 2022-10-02 NOTE — DIETITIAN INITIAL EVALUATION ADULT - OTHER INFO
UBW: 110 pounds  Dosing wt: 45 kg (99 pounds)  Wt hx per chart: 44.5 kg (9/30/22), 48.5 kg (3/18/21), 51.3 kg (1/22/21).   RD to continue to monitor weight trends as able.   Meds: atorvastatin, vitamin D3.

## 2022-10-02 NOTE — PROGRESS NOTE ADULT - ASSESSMENT
89 yo woman presents with recently diagnosed Covid 19. Patient found to be hypoxic and will be admit tied for further treatment     #COVID  still doing well on RA  - c/w dex and remdesivir  - ok to shower per request per nursing protocols re isolation.   - no O2 requirements

## 2022-10-02 NOTE — DIETITIAN INITIAL EVALUATION ADULT - PERSON TAUGHT/METHOD
Pt denied Coumadin education - reports she has been taking Coumadin x several years and is aware of the vitamin K interactions.

## 2022-10-02 NOTE — DIETITIAN INITIAL EVALUATION ADULT - ORAL INTAKE PTA/DIET HISTORY
Pt reports poor appetite and PO intake PTA. Pt reports consuming regular foods at home, but confirms she likes soft and bite sized per current diet order. Pt confirms no issues chewing/swallowing. Pt confirms NKFA.

## 2022-10-02 NOTE — PROGRESS NOTE ADULT - SUBJECTIVE AND OBJECTIVE BOX
Subjective:    INTERVAL HPI/OVERNIGHT EVENTS:   No acute events overnight  Afebrile, hemodynamically stable   - doing well asking about shower  - no SOB, no cough no CP.     Telemetry:    T(F): 97.5 (10-02-22 @ 11:59), Max: 98.1 (10-01-22 @ 21:46)  HR: 64 (10-02-22 @ 11:59) (60 - 64)  BP: 111/71 (10-02-22 @ 11:59) (103/67 - 116/66)  RR: 18 (10-02-22 @ 11:59) (16 - 20)  SpO2: 98% (10-02-22 @ 11:59) (95% - 98%)  I&O's Summary    01 Oct 2022 07:01  -  02 Oct 2022 07:00  --------------------------------------------------------  IN: 250 mL / OUT: 150 mL / NET: 100 mL      Weight (kg): 45 (22 @ 18:47)    Medications:  atorvastatin 20 milliGRAM(s) Oral at bedtime  chlorhexidine 2% Cloths 1 Application(s) Topical daily  cholecalciferol 1000 Unit(s) Oral daily  dexAMETHasone     Tablet 6 milliGRAM(s) Oral daily  influenza  Vaccine (HIGH DOSE) 0.7 milliLiter(s) IntraMuscular once  metoprolol tartrate 25 milliGRAM(s) Oral two times a day  NIFEdipine XL 30 milliGRAM(s) Oral daily  remdesivir  IVPB   IV Intermittent   remdesivir  IVPB 100 milliGRAM(s) IV Intermittent every 24 hours      PHYSICAL EXAM:  PHYSICAL EXAM:  GENERAL: NAD,   HEAD:  Atraumatic, Normocephalic  EYES: EOMI, PERRL, conjunctiva and sclera clear  NECK: No JVD  CHEST/LUNG: Clear to auscultation bilaterally; No wheeze  HEART: (+) Irregular rate and rhythm; No murmurs, rubs, or gallops  ABDOMEN: Soft, Nontender, Nondistended; Bowel sounds present  EXTREMITIES:  2+ Peripheral Pulses, No clubbing, cyanosis, or edema  PSYCH: AAOx3  NEUROLOGY: non-focal  SKIN: No rashes or lesions    Notable Labs:    Labs:                          12.6   7.22  )-----------( 228      ( 02 Oct 2022 11:05 )             37.8     10-    134<L>  |  101  |  38<H>  ----------------------------<  144<H>  4.6   |  21<L>  |  1.00    Ca    8.9      02 Oct 2022 11:05  Phos  2.7     10-  Mg     1.8     10-    TPro  6.6  /  Alb  3.9  /  TBili  0.4  /  DBili  x   /  AST  21  /  ALT  15  /  AlkPhos  84  10-    LIVER FUNCTIONS - ( 02 Oct 2022 11:05 )  Alb: 3.9 g/dL / Pro: 6.6 g/dL / ALK PHOS: 84 U/L / ALT: 15 U/L / AST: 21 U/L / GGT: x           PT/INR - ( 02 Oct 2022 11:05 )   PT: 40.6 sec;   INR: 3.49 ratio         PTT - ( 02 Oct 2022 11:05 )  PTT:40.4 sec  CAPILLARY BLOOD GLUCOSE                Urinalysis Basic - ( 01 Oct 2022 06:56 )    Color: Yellow / Appearance: Clear / S.027 / pH: x  Gluc: x / Ketone: Negative  / Bili: Negative / Urobili: Negative   Blood: x / Protein: 30 mg/dL / Nitrite: Positive   Leuk Esterase: Small / RBC: 2 /hpf / WBC 12 /HPF   Sq Epi: x / Non Sq Epi: 3 /hpf / Bacteria: Moderate        Micro:      RADIOLOGY & ADDITIONAL TESTS:    NNI

## 2022-10-03 NOTE — PROGRESS NOTE ADULT - SUBJECTIVE AND OBJECTIVE BOX
Oralia Loyola MD  PGY 2 Department of Internal Medicine  Pager: [phone] ([facility]) /[phone] ([facility])       Patient is a 89y old  Female who presents with a chief complaint of Covid with hypoxia (02 Oct 2022 13:01)      SUBJECTIVE / OVERNIGHT EVENTS: Pt seen and examined. No acute overnight events. on day 4 of Rem, urine cx growing E coli         MEDICATIONS  (STANDING):  atorvastatin 20 milliGRAM(s) Oral at bedtime  cefTRIAXone   IVPB      chlorhexidine 2% Cloths 1 Application(s) Topical daily  cholecalciferol 1000 Unit(s) Oral daily  dexAMETHasone     Tablet 6 milliGRAM(s) Oral daily  influenza  Vaccine (HIGH DOSE) 0.7 milliLiter(s) IntraMuscular once  metoprolol tartrate 25 milliGRAM(s) Oral two times a day  NIFEdipine XL 30 milliGRAM(s) Oral daily  remdesivir  IVPB   IV Intermittent   remdesivir  IVPB 100 milliGRAM(s) IV Intermittent every 24 hours    MEDICATIONS  (PRN):      I&O's Summary    02 Oct 2022 07:01  -  03 Oct 2022 07:00  --------------------------------------------------------  IN: 250 mL / OUT: 0 mL / NET: 250 mL        Vital Signs Last 24 Hrs  T(C): 36.4 (03 Oct 2022 06:29), Max: 36.4 (02 Oct 2022 11:59)  T(F): 97.6 (03 Oct 2022 06:29), Max: 97.6 (02 Oct 2022 22:06)  HR: 65 (03 Oct 2022 06:29) (62 - 65)  BP: 126/71 (03 Oct 2022 06:29) (109/69 - 126/71)  BP(mean): --  RR: 18 (03 Oct 2022 06:29) (18 - 18)  SpO2: 98% (03 Oct 2022 06:29) (93% - 98%)    Parameters below as of 03 Oct 2022 06:29  Patient On (Oxygen Delivery Method): room air        CAPILLARY BLOOD GLUCOSE          PHYSICAL EXAM:  GENERAL: NAD,   HEAD:  Atraumatic, Normocephalic  EYES: EOMI, PERRL, conjunctiva and sclera clear  NECK: No JVD  CHEST/LUNG: Clear to auscultation bilaterally; No wheeze  HEART: (+) Irregular rate and rhythm; No murmurs, rubs, or gallops  ABDOMEN: Soft, Nontender, Nondistended; Bowel sounds present  EXTREMITIES:  2+ Peripheral Pulses, No clubbing, cyanosis, or edema  PSYCH: AAOx3  NEUROLOGY: non-focal  SKIN: No rashes or lesions       LABS:                        12.8   7.78  )-----------( 236      ( 03 Oct 2022 07:23 )             38.3     Auto Eosinophil # x     / Auto Eosinophil % x     / Auto Neutrophil # x     / Auto Neutrophil % x     / BANDS % x                            12.6   7.22  )-----------( 228      ( 02 Oct 2022 11:05 )             37.8     Auto Eosinophil # x     / Auto Eosinophil % x     / Auto Neutrophil # x     / Auto Neutrophil % x     / BANDS % x        10-02    134<L>  |  101  |  38<H>  ----------------------------<  144<H>  4.6   |  21<L>  |  1.00    Ca    8.9      02 Oct 2022 11:05  Mg     1.8     10-02  Phos  2.7     10-02  TPro  6.6  /  Alb  3.9  /  TBili  0.4  /  DBili  x   /  AST  21  /  ALT  15  /  AlkPhos  84  10-02    PT/INR - ( 02 Oct 2022 11:05 )   PT: 40.6 sec;   INR: 3.49 ratio         PTT - ( 02 Oct 2022 11:05 )  PTT:40.4 sec

## 2022-10-03 NOTE — PROGRESS NOTE ADULT - PROBLEM SELECTOR PLAN 2
continue supplemental O2  will continue to monitor O2 sats  pulmonary eval as needed  O2 sats have improved  wean off O2 as tolerated  on room air

## 2022-10-03 NOTE — PROGRESS NOTE ADULT - PROBLEM SELECTOR PLAN 1
Patient on a course of decadron and remdesivir   Will finish redesivir today  continue decadron   continue to monitor labs and glucose

## 2022-10-03 NOTE — PROGRESS NOTE ADULT - PROBLEM SELECTOR PLAN 1
with hypoxia will start Patient on a course of decadron and remdesivir   CXR was unremarkable  continue to monitor labs and glucose  on day 4/5 Rem

## 2022-10-03 NOTE — PROGRESS NOTE ADULT - SUBJECTIVE AND OBJECTIVE BOX
89 yo female with PMHx of AS, AFib (on warfarin), CHF s/p pacemaker, HTN, HLD presented for intermittent dyspnea x 4 days iso recent COVID infection (tested positive on 9/25/22). Pt endorsed generalized fatigue/malaise, lack of appetite, cough, intermittent SOB. Denied chest pain, fevers, chills, abdominal pain, dysuria. Patient was brought to [facility] for further evaluation and treatment. In the ED the Patient was found to have O2 sats in the 80's which improved with supplemental O2. Patient seen now resting comfortably. Has been off O2 and sats have been stable.       MEDICATIONS  (STANDING):  atorvastatin 20 milliGRAM(s) Oral at bedtime  cefTRIAXone   IVPB      chlorhexidine 2% Cloths 1 Application(s) Topical daily  cholecalciferol 1000 Unit(s) Oral daily  dexAMETHasone     Tablet 6 milliGRAM(s) Oral daily  influenza  Vaccine (HIGH DOSE) 0.7 milliLiter(s) IntraMuscular once  metoprolol tartrate 25 milliGRAM(s) Oral two times a day  NIFEdipine XL 30 milliGRAM(s) Oral daily  remdesivir  IVPB   IV Intermittent   remdesivir  IVPB 100 milliGRAM(s) IV Intermittent every 24 hours  warfarin 2 milliGRAM(s) Oral once    MEDICATIONS  (PRN):          VITALS:   T(C): 36.4 (10-03-22 @ 14:13), Max: 36.4 (10-02-22 @ 22:06)  HR: 58 (10-03-22 @ 14:13) (58 - 65)  BP: 119/68 (10-03-22 @ 14:13) (109/69 - 126/71)  RR: 18 (10-03-22 @ 14:13) (18 - 18)  SpO2: 96% (10-03-22 @ 14:13) (93% - 98%)  Wt(kg): --    PHYSICAL EXAM:  GENERAL: NAD, cachectic   HEAD:  Atraumatic  EYES: EOM, PERRLA, conjunctiva pink and sclera white  ENT: No tonsillar erythema, exudates, or enlargement, moist mucous membranes, good dentition, no lesions  NECK: Supple, No JVD, normal thyroid, carotids with normal upstrokes and no bruits  CHEST/LUNG: Clear to auscultation bilaterally, No rales, rhonchi, wheezing, or rubs  HEART: Regular rate and rhythm, No murmurs, rubs, or gallops  ABDOMEN: Soft, nondistended, no masses, guarding, tenderness or rebound, bowel sounds present  EXTREMITIES:  2+ Peripheral Pulses, No clubbing, cyanosis, or edema.   LYMPH: No lymphadenopathy noted  SKIN: No rashes or lesions  NERVOUS SYSTEM:  Alert & Oriented X2-3, normal cognitive function. Motor Strength 5/5 right upper and right       LABS:        CBC Full  -  ( 03 Oct 2022 07:23 )  WBC Count : 7.78 K/uL  RBC Count : 3.98 M/uL  Hemoglobin : 12.8 g/dL  Hematocrit : 38.3 %  Platelet Count - Automated : 236 K/uL  Mean Cell Volume : 96.2 fl  Mean Cell Hemoglobin : 32.2 pg  Mean Cell Hemoglobin Concentration : 33.4 gm/dL  Auto Neutrophil # : x  Auto Lymphocyte # : x  Auto Monocyte # : x  Auto Eosinophil # : x  Auto Basophil # : x  Auto Neutrophil % : x  Auto Lymphocyte % : x  Auto Monocyte % : x  Auto Eosinophil % : x  Auto Basophil % : x    10-03    138  |  104  |  37<H>  ----------------------------<  90  5.0   |  23  |  0.96    Ca    9.2      03 Oct 2022 07:20  Phos  3.0     10-03  Mg     2.0     10-03    TPro  6.6  /  Alb  3.6  /  TBili  0.4  /  DBili  x   /  AST  20  /  ALT  15  /  AlkPhos  81  10-03    LIVER FUNCTIONS - ( 03 Oct 2022 07:20 )  Alb: 3.6 g/dL / Pro: 6.6 g/dL / ALK PHOS: 81 U/L / ALT: 15 U/L / AST: 20 U/L / GGT: x           PT/INR - ( 03 Oct 2022 07:24 )   PT: 32.9 sec;   INR: 2.81 ratio         PTT - ( 03 Oct 2022 07:24 )  PTT:40.6 sec    CAPILLARY BLOOD GLUCOSE          RADIOLOGY & ADDITIONAL TESTS:

## 2022-10-04 NOTE — DISCHARGE NOTE PROVIDER - HOSPITAL COURSE
89 yo female with PMHx of AS, AFib (on warfarin), CHF s/p pacemaker, HTN, HLD presented for intermittent dyspnea x 4 days iso recent COVID infection (tested positive on 9/25/22). Pt endorsed generalized fatigue/malaise, lack of appetite, cough, intermittent SOB. Denied chest pain, fevers, chills, abdominal pain, dysuria. Patient was brought to [facility] for further evaluation and treatment. In the ED the Patient was found to have O2 sats in the 80's which improved with supplemental O2. Patient seen now resting comfortably    Hospital Course: Patient was found to be COVID positive with normal CXR. Patient weaned to RA, started on dexamethasone and Remdesevir. PT evaluated patient, no PT needs, can return to facility.   Urine cx positive for E Coli, pan-sensitive, started on Ceftriaxone, completed two days, will be discharged on one day of Cefdinir.   Patient hemodynamically stable, afebrile, stable for discharge

## 2022-10-04 NOTE — PROGRESS NOTE ADULT - PROBLEM SELECTOR PLAN 5
Patient found to have an Ecoli Urinary tract infection  started on ceftriaxone  sensitivities pending  will switch to PO abx at time of DC
Patient found to have an Ecoli Urinary tract infection  started on ceftriaxone  sensitivities pending  will switch to PO abx at time of DC
Continue to monitor  No syncope or chest pain  follow for fluid overload  avoid fluid shifts  would use diuretic judiciously
Patient found to have an Ecoli Urinary tract infection  started on ceftriaxone  sensitivities pending  will switch to PO abx at time of DC

## 2022-10-04 NOTE — DISCHARGE NOTE PROVIDER - NSDCMRMEDTOKEN_GEN_ALL_CORE_FT
cefadroxil 500 mg oral capsule: 1 cap(s) orally every 12 hours with first dose to be taken tomorrow 2/25 at 6:00am. Then take every 12 hours until bottle is empty   Coumadin 5mg oral tablet: 0.5 tab(s) orally once a day for two consecutive days and then followed by 1 tablet on the 3rd day and then repeat  Discharge Instructions : Please follow up with Dr. Donovan on 03/12/21 at 10:15am. Please call office to confirm appointment     Please follow up with your cardiologist Dr. Mujica in 1-2 weeks or sooner for INR check    Please start Cefadroxil 500mg every 12 hours with your first dose to be taken tomorrow 2/25 at 6:00am and then every 12 hours until bottle is empty     Please take 2.5mg of coumadin for two consecutive days, starting tonight followed by 5mg of coumadin.     Can take Tylenol as needed for implantation site discomfort   Lipitor 40 mg oral tablet: 1 tab(s) orally once a day  metoprolol tartrate 25 mg oral tablet: 1 tab(s) orally 2 times a day  NIFEdipine 30 mg oral tablet, extended release: 1 tab(s) orally once a day  Vitamin D3 1000 intl units (25 mcg) oral tablet: 1 tab(s) orally once a day   cefdinir 300 mg oral capsule: 1 cap(s) orally 2 times a day   Coumadin 5mg oral tablet: 0.5 tab(s) orally once a day   dexamethasone 6 mg oral tablet: 1 tab(s) orally once a day  Lipitor 40 mg oral tablet: 1 tab(s) orally once a day  metoprolol tartrate 25 mg oral tablet: 1 tab(s) orally 2 times a day  NIFEdipine 30 mg oral tablet, extended release: 1 tab(s) orally once a day  Vitamin D3 1000 intl units (25 mcg) oral tablet: 1 tab(s) orally once a day

## 2022-10-04 NOTE — DISCHARGE NOTE NURSING/CASE MANAGEMENT/SOCIAL WORK - NSDCPEFALRISK_GEN_ALL_CORE
For information on Fall & Injury Prevention, visit: [email or web address] OR  [email or web address] OR  https://www.cdc.gov/steadi/patient.html

## 2022-10-04 NOTE — DISCHARGE NOTE NURSING/CASE MANAGEMENT/SOCIAL WORK - PATIENT PORTAL LINK FT
You can access the FollowMyHealth Patient Portal offered by [facility] by registering at the following website: [email or web address]. By joining [facility]’s FollowMyHealth portal, you will also be able to view your health information using other applications (apps) compatible with our system.

## 2022-10-04 NOTE — PROGRESS NOTE ADULT - PROBLEM SELECTOR PROBLEM 5
UTI (urinary tract infection)
Aortic stenosis

## 2022-10-04 NOTE — PROGRESS NOTE ADULT - NUTRITIONAL ASSESSMENT
This patient has been assessed with a concern for Malnutrition and has been determined to have a diagnosis/diagnoses of Severe protein-calorie malnutrition.    This patient is being managed with:   Diet Regular-  Soft and Bite Sized (SOFTBTSZ)  Low Sodium  Supplement Feeding Modality:  Oral  Ensure Enlive Cans or Servings Per Day:  1       Frequency:  Daily  Ensure Pudding Cans or Servings Per Day:  1       Frequency:  Daily  Entered: Oct  2 2022 11:52AM    
This patient has been assessed with a concern for Malnutrition and has been determined to have a diagnosis/diagnoses of Severe protein-calorie malnutrition.    This patient is being managed with:   Diet Regular-  Soft and Bite Sized (SOFTBTSZ)  Low Sodium  Supplement Feeding Modality:  Oral  Ensure Enlive Cans or Servings Per Day:  1       Frequency:  Daily  Ensure Pudding Cans or Servings Per Day:  1       Frequency:  Daily  Entered: Oct  2 2022 11:52AM    
This patient has been assessed with a concern for Malnutrition and has been determined to have a diagnosis/diagnoses of Severe protein-calorie malnutrition.    This patient is being managed with:   Diet Soft and Bite Sized-  DASH/TLC {Sodium & Cholesterol Restricted} (DASH)  Entered: Sep 30 2022  6:55PM    The following pending diet order is being considered for treatment of Severe protein-calorie malnutrition:  Diet Regular-  Soft and Bite Sized (SOFTBTSZ)  Low Sodium  Supplement Feeding Modality:  Oral  Ensure Enlive Cans or Servings Per Day:  1       Frequency:  Daily  Ensure Pudding Cans or Servings Per Day:  1       Frequency:  Daily  Entered: Oct  2 2022 11:52AM  
This patient has been assessed with a concern for Malnutrition and has been determined to have a diagnosis/diagnoses of Severe protein-calorie malnutrition.    This patient is being managed with:   Diet Regular-  Soft and Bite Sized (SOFTBTSZ)  Low Sodium  Supplement Feeding Modality:  Oral  Ensure Enlive Cans or Servings Per Day:  1       Frequency:  Daily  Ensure Pudding Cans or Servings Per Day:  1       Frequency:  Daily  Entered: Oct  2 2022 11:52AM    
This patient has been assessed with a concern for Malnutrition and has been determined to have a diagnosis/diagnoses of Severe protein-calorie malnutrition.    This patient is being managed with:   Diet Regular-  Soft and Bite Sized (SOFTBTSZ)  Low Sodium  Supplement Feeding Modality:  Oral  Ensure Enlive Cans or Servings Per Day:  1       Frequency:  Daily  Ensure Pudding Cans or Servings Per Day:  1       Frequency:  Daily  Entered: Oct  2 2022 11:52AM    
This patient has been assessed with a concern for Malnutrition and has been determined to have a diagnosis/diagnoses of Severe protein-calorie malnutrition.    This patient is being managed with:   Diet Soft and Bite Sized-  DASH/TLC {Sodium & Cholesterol Restricted} (DASH)  Entered: Sep 30 2022  6:55PM    The following pending diet order is being considered for treatment of Severe protein-calorie malnutrition:  Diet Regular-  Soft and Bite Sized (SOFTBTSZ)  Low Sodium  Supplement Feeding Modality:  Oral  Ensure Enlive Cans or Servings Per Day:  1       Frequency:  Daily  Ensure Pudding Cans or Servings Per Day:  1       Frequency:  Daily  Entered: Oct  2 2022 11:52AM

## 2022-10-04 NOTE — DISCHARGE NOTE NURSING/CASE MANAGEMENT/SOCIAL WORK - NSDCVIVACCINE_GEN_ALL_CORE_FT
Tdap; 30-Jun-2015 15:35; Tito Duarte (RN); [employer]; [id number]; IntraMuscular; Deltoid Left.; 0.5 milliLiter(s); VIS (VIS Published: 09-May-2013, VIS Presented: 30-Jun-2015);

## 2022-10-04 NOTE — PROGRESS NOTE ADULT - PROBLEM SELECTOR PLAN 1
Patient on a course of decadron and remdesivir   Will finish redesivir today  continue decadron   continue to monitor labs and glucose Patient on a course of decadron and remdesivir   Remdesivir course complete  continue decadron   continue to monitor labs and glucose

## 2022-10-04 NOTE — PROGRESS NOTE ADULT - SUBJECTIVE AND OBJECTIVE BOX
Hillary Sanders MD  PGY 1 Department of Internal Medicine        Patient is a 89y old  Female who presents with a chief complaint of Covid with hypoxia (03 Oct 2022 15:16)      SUBJECTIVE / OVERNIGHT EVENTS: Pt seen and examined. No acute overnight events. Denies fevers, chills, CP, SOB, Abdominal pain, N/V, Constipation, Diarrhea        MEDICATIONS  (STANDING):  atorvastatin 20 milliGRAM(s) Oral at bedtime  cefTRIAXone   IVPB      cefTRIAXone   IVPB 1000 milliGRAM(s) IV Intermittent every 24 hours  chlorhexidine 2% Cloths 1 Application(s) Topical daily  cholecalciferol 1000 Unit(s) Oral daily  dexAMETHasone     Tablet 6 milliGRAM(s) Oral daily  influenza  Vaccine (HIGH DOSE) 0.7 milliLiter(s) IntraMuscular once  metoprolol tartrate 25 milliGRAM(s) Oral two times a day  NIFEdipine XL 30 milliGRAM(s) Oral daily  remdesivir  IVPB   IV Intermittent   remdesivir  IVPB 100 milliGRAM(s) IV Intermittent every 24 hours    MEDICATIONS  (PRN):      I&O's Summary    03 Oct 2022 07:01  -  04 Oct 2022 07:00  --------------------------------------------------------  IN: 50 mL / OUT: 0 mL / NET: 50 mL        Vital Signs Last 24 Hrs  T(C): 36.4 (04 Oct 2022 06:30), Max: 36.4 (03 Oct 2022 14:13)  T(F): 97.5 (04 Oct 2022 06:30), Max: 97.5 (03 Oct 2022 14:13)  HR: 66 (04 Oct 2022 06:30) (58 - 87)  BP: 123/78 (04 Oct 2022 06:30) (119/68 - 128/80)  BP(mean): --  RR: 18 (04 Oct 2022 06:30) (18 - 20)  SpO2: 98% (04 Oct 2022 06:30) (96% - 98%)    Parameters below as of 04 Oct 2022 06:30  Patient On (Oxygen Delivery Method): room air        CAPILLARY BLOOD GLUCOSE          PHYSICAL EXAM:  GENERAL: NAD,   HEAD:  Atraumatic, Normocephalic  EYES: EOMI, PERRL, conjunctiva and sclera clear  NECK: No JVD  CHEST/LUNG: Clear to auscultation bilaterally; No wheeze  HEART: Regular rate and rhythm; No murmurs, rubs, or gallops  ABDOMEN: Soft, Nontender, Nondistended; Bowel sounds present  EXTREMITIES:  2+ Peripheral Pulses, No clubbing, cyanosis, or edema  PSYCH: AAOx3  NEUROLOGY: non-focal  SKIN: No rashes or lesions       LABS:                        14.4   10.26 )-----------( 288      ( 04 Oct 2022 07:17 )             43.9     Auto Eosinophil # x     / Auto Eosinophil % x     / Auto Neutrophil # x     / Auto Neutrophil % x     / BANDS % x                            12.8   7.78  )-----------( 236      ( 03 Oct 2022 07:23 )             38.3     Auto Eosinophil # x     / Auto Eosinophil % x     / Auto Neutrophil # x     / Auto Neutrophil % x     / BANDS % x                            12.6   7.22  )-----------( 228      ( 02 Oct 2022 11:05 )             37.8     Auto Eosinophil # x     / Auto Eosinophil % x     / Auto Neutrophil # x     / Auto Neutrophil % x     / BANDS % x        10-03    138  |  104  |  37<H>  ----------------------------<  90  5.0   |  23  |  0.96  10-02    134<L>  |  101  |  38<H>  ----------------------------<  144<H>  4.6   |  21<L>  |  1.00    Ca    9.2      03 Oct 2022 07:20  Mg     2.0     10-03  Phos  3.0     10-03  TPro  6.6  /  Alb  3.6  /  TBili  0.4  /  DBili  x   /  AST  20  /  ALT  15  /  AlkPhos  81  10-03  TPro  6.6  /  Alb  3.9  /  TBili  0.4  /  DBili  x   /  AST  21  /  ALT  15  /  AlkPhos  84  10-02    PT/INR - ( 03 Oct 2022 07:24 )   PT: 32.9 sec;   INR: 2.81 ratio         PTT - ( 03 Oct 2022 07:24 )  PTT:40.6 sec              RADIOLOGY & ADDITIONAL TESTS:    Imaging Personally Reviewed:    Consultant(s) Notes Reviewed:      Care Discussed with Consultants/Other Providers:   Hillary Sanders MD  PGY 1 Department of Internal Medicine        Patient is a 89y old  Female who presents with a chief complaint of Covid with hypoxia (03 Oct 2022 15:16)      SUBJECTIVE / OVERNIGHT EVENTS: Pt seen and examined. No acute overnight events. Denies fevers, chills, CP, SOB, Abdominal pain, N/V, Constipation, Diarrhea        MEDICATIONS  (STANDING):  atorvastatin 20 milliGRAM(s) Oral at bedtime  cefTRIAXone   IVPB      cefTRIAXone   IVPB 1000 milliGRAM(s) IV Intermittent every 24 hours  chlorhexidine 2% Cloths 1 Application(s) Topical daily  cholecalciferol 1000 Unit(s) Oral daily  dexAMETHasone     Tablet 6 milliGRAM(s) Oral daily  influenza  Vaccine (HIGH DOSE) 0.7 milliLiter(s) IntraMuscular once  metoprolol tartrate 25 milliGRAM(s) Oral two times a day  NIFEdipine XL 30 milliGRAM(s) Oral daily  remdesivir  IVPB   IV Intermittent   remdesivir  IVPB 100 milliGRAM(s) IV Intermittent every 24 hours    MEDICATIONS  (PRN):      I&O's Summary    03 Oct 2022 07:01  -  04 Oct 2022 07:00  --------------------------------------------------------  IN: 50 mL / OUT: 0 mL / NET: 50 mL        Vital Signs Last 24 Hrs  T(C): 36.4 (04 Oct 2022 06:30), Max: 36.4 (03 Oct 2022 14:13)  T(F): 97.5 (04 Oct 2022 06:30), Max: 97.5 (03 Oct 2022 14:13)  HR: 66 (04 Oct 2022 06:30) (58 - 87)  BP: 123/78 (04 Oct 2022 06:30) (119/68 - 128/80)  BP(mean): --  RR: 18 (04 Oct 2022 06:30) (18 - 20)  SpO2: 98% (04 Oct 2022 06:30) (96% - 98%)    Parameters below as of 04 Oct 2022 06:30  Patient On (Oxygen Delivery Method): room air        CAPILLARY BLOOD GLUCOSE      PHYSICAL EXAM:  GENERAL: NAD,   HEAD:  Atraumatic, Normocephalic  EYES: EOMI, PERRL, conjunctiva and sclera clear  NECK: No JVD  CHEST/LUNG: Clear to auscultation bilaterally; No wheeze  HEART: (+) Irregular rate and rhythm; No murmurs, rubs, or gallops  ABDOMEN: Soft, Nontender, Nondistended; Bowel sounds present  EXTREMITIES:  2+ Peripheral Pulses, No clubbing, cyanosis, or edema  PSYCH: AAOx3  NEUROLOGY: non-focal  SKIN: No rashes or lesions         LABS:                        14.4   10.26 )-----------( 288      ( 04 Oct 2022 07:17 )             43.9     Auto Eosinophil # x     / Auto Eosinophil % x     / Auto Neutrophil # x     / Auto Neutrophil % x     / BANDS % x                            12.8   7.78  )-----------( 236      ( 03 Oct 2022 07:23 )             38.3     Auto Eosinophil # x     / Auto Eosinophil % x     / Auto Neutrophil # x     / Auto Neutrophil % x     / BANDS % x                            12.6   7.22  )-----------( 228      ( 02 Oct 2022 11:05 )             37.8     Auto Eosinophil # x     / Auto Eosinophil % x     / Auto Neutrophil # x     / Auto Neutrophil % x     / BANDS % x        10-03    138  |  104  |  37<H>  ----------------------------<  90  5.0   |  23  |  0.96  10-02    134<L>  |  101  |  38<H>  ----------------------------<  144<H>  4.6   |  21<L>  |  1.00    Ca    9.2      03 Oct 2022 07:20  Mg     2.0     10-03  Phos  3.0     10-03  TPro  6.6  /  Alb  3.6  /  TBili  0.4  /  DBili  x   /  AST  20  /  ALT  15  /  AlkPhos  81  10-03  TPro  6.6  /  Alb  3.9  /  TBili  0.4  /  DBili  x   /  AST  21  /  ALT  15  /  AlkPhos  84  10-02    PT/INR - ( 03 Oct 2022 07:24 )   PT: 32.9 sec;   INR: 2.81 ratio         PTT - ( 03 Oct 2022 07:24 )  PTT:40.6 sec              RADIOLOGY & ADDITIONAL TESTS:    Imaging Personally Reviewed:    Consultant(s) Notes Reviewed:      Care Discussed with Consultants/Other Providers:

## 2022-10-04 NOTE — PROGRESS NOTE ADULT - TIME BILLING
Discussed treatment plan with patient at bedside.
DC to assisted living  continue current meds  PO as tolerated  activity as tolerated   follow up with Dr Griffith  Patient aware of plan
Discussed treatment plan with patient at bedside.
Discussed treatment plan with patient at bedside.

## 2022-10-04 NOTE — PHARMACOTHERAPY INTERVENTION NOTE - COMMENTS
ZANDER NO, 89y Female with admission for COVID-19 related symptoms, on admission the patient had a urine culture taken that grew E. coli (>100k cfu/mL), and 4 days after her admission she was started on ceftriaxone (was not started on abx on admission). I discussed with MD Sanders, noted that the patient did not have urinary symptoms documented in notes, MD confirmed no urinary symptoms.    Recommendation(s):  1) Based on review of chart and discussion with MD, suggest D/C ceftriaxone since patient has asymptomatic bacteriuria.  2) MD Sanders agreed to D/C abx, and will not discharge patient home on abx    With kind regards,  Dyllan Downs, RositaD  Infectious Diseases Clinical Pharmacist  Available on [employer] Teams  .

## 2022-10-04 NOTE — PROGRESS NOTE ADULT - ASSESSMENT
89 yo woman presents with recently diagnosed Covid 19. Patient found to be hypoxic and will be admitted for further treatment     #COVID  still doing well on RA  - c/w dex and remdesivir  - ok to shower per request per nursing protocols re isolation.   - no O2 requirements  89 yo woman presents with recently diagnosed Covid 19. Patient found to be hypoxic, now oxygenating well on RA and during ambulation.

## 2022-10-04 NOTE — PROGRESS NOTE ADULT - PROBLEM SELECTOR PLAN 1
Remdesivir course complete  continue decadron complete a 10 day course  continue to monitor labs and glucose

## 2022-10-04 NOTE — PROGRESS NOTE ADULT - PROBLEM SELECTOR PLAN 2
Patient has been off O2  check sats with ambulation   O2 as needed Patient has been off O2, satting well on ambulation  O2 as needed

## 2022-10-04 NOTE — DISCHARGE NOTE PROVIDER - NSDCCPCAREPLAN_GEN_ALL_CORE_FT
PRINCIPAL DISCHARGE DIAGNOSIS  Diagnosis: Dyspnea due to COVID-19  Assessment and Plan of Treatment: You came to the hospital with fevers and some low oxygen levels, found to be COVID positive   You finished 5 days of Remdesevir   You finished 5 days of Dexamethasone and need to complete 5 more days   If you experience worsening shortness of breath or low oxygen levels, or worsening shortness of breath, come back to ED      SECONDARY DISCHARGE DIAGNOSES  Diagnosis: Acute UTI  Assessment and Plan of Treatment: While here, your urine culture was positive for E Coli, you completed 2 days of Ceftriaxone, please take one day of Cefdinir antibiotics to complete your course.    Diagnosis: Chronic atrial fibrillation  Assessment and Plan of Treatment: Please continue taking metoprolol and Eliquis, you can coninue to take 2.5mg (0.5 tab) daily.

## 2022-10-04 NOTE — DISCHARGE NOTE PROVIDER - CARE PROVIDER_API CALL
[facility]  [address]  [place] [address]  Phone: [phone]  Fax: [phone]  Follow Up Time:

## 2022-10-04 NOTE — PROGRESS NOTE ADULT - SUBJECTIVE AND OBJECTIVE BOX
89 yo female with PMHx of AS, AFib (on warfarin), CHF s/p pacemaker, HTN, HLD presented for intermittent dyspnea x 4 days iso recent COVID infection (tested positive on 9/25/22). Pt endorsed generalized fatigue/malaise, lack of appetite, cough, intermittent SOB. Denied chest pain, fevers, chills, abdominal pain, dysuria. Patient was brought to [facility] for further evaluation and treatment. In the ED the Patient was found to have O2 sats in the 80's which improved with supplemental O2. Patient seen now resting comfortably. Has been off O2 and sats have been stable. Patient scheduled for DC back to assisted living today      MEDICATIONS  (STANDING):  atorvastatin 20 milliGRAM(s) Oral at bedtime  chlorhexidine 2% Cloths 1 Application(s) Topical daily  cholecalciferol 1000 Unit(s) Oral daily  dexAMETHasone     Tablet 6 milliGRAM(s) Oral daily  metoprolol tartrate 25 milliGRAM(s) Oral two times a day  NIFEdipine XL 30 milliGRAM(s) Oral daily    MEDICATIONS  (PRN):          VITALS:   T(C): 36.5 (10-04-22 @ 14:05), Max: 36.5 (10-04-22 @ 14:05)  HR: 60 (10-04-22 @ 14:05) (60 - 87)  BP: 134/75 (10-04-22 @ 14:05) (123/45 - 134/75)  RR: 18 (10-04-22 @ 14:05) (18 - 20)  SpO2: 96% (10-04-22 @ 14:05) (96% - 98%)  Wt(kg): --    PHYSICAL EXAM:  GENERAL: NAD, cachectic   HEAD:  Atraumatic  EYES: EOM, PERRLA, conjunctiva pink and sclera white  ENT: No tonsillar erythema, exudates, or enlargement, moist mucous membranes, good dentition, no lesions  NECK: Supple, No JVD, normal thyroid, carotids with normal upstrokes and no bruits  CHEST/LUNG: Clear to auscultation bilaterally, No rales, rhonchi, wheezing, or rubs  HEART: Regular rate and rhythm, No murmurs, rubs, or gallops  ABDOMEN: Soft, nondistended, no masses, guarding, tenderness or rebound, bowel sounds present  EXTREMITIES:  2+ Peripheral Pulses, No clubbing, cyanosis, or edema.   LYMPH: No lymphadenopathy noted  SKIN: No rashes or lesions  NERVOUS SYSTEM:  Alert & Oriented X2-3, normal cognitive function. Motor Strength 5/5 right upper and right       LABS:        CBC Full  -  ( 04 Oct 2022 07:17 )  WBC Count : 10.26 K/uL  RBC Count : 4.45 M/uL  Hemoglobin : 14.4 g/dL  Hematocrit : 43.9 %  Platelet Count - Automated : 288 K/uL  Mean Cell Volume : 98.7 fl  Mean Cell Hemoglobin : 32.4 pg  Mean Cell Hemoglobin Concentration : 32.8 gm/dL  Auto Neutrophil # : x  Auto Lymphocyte # : x  Auto Monocyte # : x  Auto Eosinophil # : x  Auto Basophil # : x  Auto Neutrophil % : x  Auto Lymphocyte % : x  Auto Monocyte % : x  Auto Eosinophil % : x  Auto Basophil % : x    10-04    138  |  102  |  36<H>  ----------------------------<  90  4.7   |  24  |  1.08    Ca    9.3      04 Oct 2022 07:14  Phos  3.1     10-04  Mg     2.0     10-04    TPro  7.1  /  Alb  4.0  /  TBili  0.5  /  DBili  x   /  AST  19  /  ALT  17  /  AlkPhos  84  10-04    LIVER FUNCTIONS - ( 04 Oct 2022 07:14 )  Alb: 4.0 g/dL / Pro: 7.1 g/dL / ALK PHOS: 84 U/L / ALT: 17 U/L / AST: 19 U/L / GGT: x           PT/INR - ( 03 Oct 2022 07:24 )   PT: 32.9 sec;   INR: 2.81 ratio         PTT - ( 03 Oct 2022 07:24 )  PTT:40.6 sec    CAPILLARY BLOOD GLUCOSE          RADIOLOGY & ADDITIONAL TESTS:

## 2022-10-04 NOTE — DISCHARGE NOTE PROVIDER - NSDCFUSCHEDAPPT_GEN_ALL_CORE_FT
[facility] Physician [facility] [phone]  Scheduled Appointment: 12/13/2022

## 2022-10-04 NOTE — PROGRESS NOTE ADULT - REASON FOR ADMISSION
Covid with hypoxia

## 2022-11-28 NOTE — ED ADULT NURSE REASSESSMENT NOTE - NS ED NURSE REASSESS COMMENT FT1
Attempted to contact admitting team NP-Charlene Castano. left a message making aware of current BP, Pt doesn't show any visible sign of distress, tolerating Bipap well. Family members are currently speaking to ED MD Gonzalez about goals of care.

## 2022-11-28 NOTE — H&P ADULT - PROBLEM SELECTOR PLAN 1
will DC bipap and start O2 via NC  probably due to the large plural effusion on the right  conservative management as per family

## 2022-11-28 NOTE — H&P ADULT - ASSESSMENT
89 yo woman presents from a nursing home with hypoxia. Patient symptoms improved with Bipap  Patient found to have a large right plural effusion

## 2022-11-28 NOTE — ED PROVIDER NOTE - PHYSICAL EXAMINATION
Gen - Cachectic appearing elderly female, alert and responsive to questioning, grossly pale; A+Ox2 (self, place)   HEENT - NCAT, EOMI, R sided cataracts, pupils otherwise equal reactive, no septal hematoma, no raccon eyes, no parra's sign  Neck - supple, no appreciable c-spine tenderness  Resp - rhonchorous throughout, tachypneic  CV -  RRR, no m/r/g  Abd - soft, NT, ND; no guarding or rebound  Back - no midline tenderness/stepoffs  MSK - no gross deformities  Extrem - 1+ LE edema  Neuro - no focal motor or sensation deficits, limited at this time but moves all extremities  Skin - cool to touch, delayed cap refill, some cyanosis at tips of b/l fingers

## 2022-11-28 NOTE — H&P ADULT - PROBLEM SELECTOR PLAN 5
Discussed with Pts family  Patient is currently DNR/DNI  will hold further blood draws  will start morphine as needed  Palliative care called to see the Pt   continue comfort measures

## 2022-11-28 NOTE — ED PROVIDER NOTE - PROGRESS NOTE DETAILS
pocus dialted rv large rt pleural effusion, small to mod left pleural effusion, veronica predom ,nonplethoric ivc -- pt on coumadin pe less likley and strain may be 2/2 to chronic dz - will still cta - bipap and diuresis - bnp > 9000 vivien underlying pulm htn , no pe on cta- mild hypercapnea pco2 60- pt with mild resp acidosis and severe metabolic acidosis- ct cpine neg fro fx , no pain cleared - ccollar w confrontational exam mild improvement in gas stil acidemic with change bipap settings, bp dec after diuresis - pt vivien was intravasc down - will give judicous fluid boluses 250 cc at a time - bp inc to 89/60  after initial bolus

## 2022-11-28 NOTE — H&P ADULT - HISTORY OF PRESENT ILLNESS
89 year old female with history of aortic stenosis, A. fib on Coumadin, CHF status post pacemaker, hypertension, hyperlipidemia brought in by EMS after being found down at nursing home.  Was found to be hypoxic in the field with cyanosis around her lips and fingertips, placed on 15 L nonrebreather mask with some improvement.  Patient here complains of difficulty breathing, but otherwise difficult to obtain history at this time due to critical illness. Patient was brought to [facility] for further evaluation and treatment. Patient seen in the ED responding to questions and commands currently on Bipap with improvement in hypoxia

## 2022-11-28 NOTE — GOALS OF CARE CONVERSATION - ADVANCED CARE PLANNING - CONVERSATION DETAILS
Pt is DNR/DNI. Hx of heart failure, severe AS, declined surgery in past. Currently on BIPAP. Has large R sided pleural effusion, family states would not want to go thru having it drained. Hypotensive, sp abx. Would not like to start pressors. Primary gold is comfort, alleviation of pain, distress, suffering. Pall, hospice c/s pending for AM.

## 2022-11-28 NOTE — ED PROVIDER NOTE - OBJECTIVE STATEMENT
89 year old female with history of aortic stenosis, A. fib on Coumadin, CHF status post pacemaker, hypertension, hyperlipidemia brought in by EMS after being found down at nursing home.  Was found to be hypoxic in the field with cyanosis around her lips and fingertips, placed on 15 L nonrebreather mask with some improvement.  Patient here complains of difficulty breathing, but otherwise difficult to obtain history at this time due to critical illness.  Fingerstick 95 in triage.  On chart review patient was admitted in October for hypoxic respiratory failure secondary to COVID-pneumonia in addition to UTI.

## 2022-11-28 NOTE — H&P ADULT - NSHPPHYSICALEXAM_GEN_ALL_CORE
PHYSICAL EXAM:  GENERAL: NAD,  cachetic  HEAD:  Atraumatic  EYES: EOM, PERRLA, conjunctiva pink and sclera white  ENT: No tonsillar erythema, exudates, or enlargement, moist mucous membranes, good dentition, no lesions  NECK: Supple, No JVD, normal thyroid, carotids with normal upstrokes and no bruits  CHEST/LUNG: Clear to auscultation bilaterally, No rales, rhonchi, wheezing, or rubs  HEART: Regular rate and rhythm, No murmurs, rubs, or gallops  ABDOMEN: Soft, nondistended, no masses, guarding, tenderness or rebound, bowel sounds present  EXTREMITIES:  2+ Peripheral Pulses, No clubbing, cyanosis, or edema.   LYMPH: No lymphadenopathy noted  SKIN: No rashes or lesions  NERVOUS SYSTEM:  Alert & Oriented X1,

## 2022-11-28 NOTE — ED ADULT NURSE NOTE - NSIMPLEMENTINTERV_GEN_ALL_ED
Implemented All Fall with Harm Risk Interventions:  [place] to call system. Call bell, personal items and telephone within reach. Instruct patient to call for assistance. Room bathroom lighting operational. Non-slip footwear when patient is off stretcher. Physically safe environment: no spills, clutter or unnecessary equipment. Stretcher in lowest position, wheels locked, appropriate side rails in place. Provide visual cue, wrist band, yellow gown, etc. Monitor gait and stability. Monitor for mental status changes and reorient to person, place, and time. Review medications for side effects contributing to fall risk. Reinforce activity limits and safety measures with patient and family. Provide visual clues: red socks.

## 2022-11-28 NOTE — ED ADULT NURSE REASSESSMENT NOTE - NS ED NURSE REASSESS COMMENT FT1
pt placed on rishi robert. MD Lino cortes. pt placed on bear hugger due to rectal temperature of 93.2, MD Xavier aware, no additional interventions required at this time.

## 2022-11-28 NOTE — ED ADULT NURSE REASSESSMENT NOTE - NS ED NURSE REASSESS COMMENT FT1
pt placed on BiPAP by respiratory therapy. pt o2 sat on BiPAP at 98%. pt tolerating well. 1425: pt placed on BiPAP by respiratory therapy. pt o2 sat on BiPAP at 98%. pt tolerating well.

## 2022-11-28 NOTE — ED ADULT NURSE REASSESSMENT NOTE - NS ED NURSE REASSESS COMMENT FT1
Goals of care discussed with patient family and MD. Spoke with JOEL Garcia, patient to be taken off bipap and placed on nasal cannula, cardiac monitor discontinued. Patient taken off bipap, placed on 5LNC, tolerating NC well. Patient responsive to verbal stimuli. Bear hugger remains in place.

## 2022-11-28 NOTE — ED ADULT NURSE NOTE - OBJECTIVE STATEMENT
89Y female, A&Ox1, PMH of Aortic stenosis, CHF, AV block, A-fib, HTN, HLD. pt presents to the ED via EMS post fall c/o hypoxia and difficulty breathing. as per ems pt found down for an unknown amount of time. ems states pt o2 sat was in the 70's when they arrived. pt presents with blue lips and finger tips. ems states pt told them she laid on the floor to breath better. pt arrived on non rebreather at 15L sating 100%. sinus rhythm on cardiac monitor. pt does not withdraw or grimace when abd is palpated. skin is cool, dry, and flush. pt unable to communicate other symptoms. 20g iv placed in left AC by EMS. 20g iv placed in right AC by RN.

## 2022-11-28 NOTE — ED PROVIDER NOTE - CLINICAL SUMMARY MEDICAL DECISION MAKING FREE TEXT BOX
mike - 89 f afib- presents found unresponsive on ground in room with hypoxia and cyanosis- now ox3 ncat gcs 15- unsure of what happened co diff breathing - dec bs bl - pocus with rv dilation and bl pl effsuoin w veronica -and ivc nonplethoric - more likley infectious pe possible w recent covid but pt is anticoag, if inr subtherapeutic and head ct neg for blood will heparinize -- empiric abx to cover pna- ekg nonischemic but pocus with signif As MAY BE ETIOLOGY OF SYNCOPE KEEP ON TELE now on nrb pt goals of care noted - DNR in paperwork - will need admisison - send cpk to exclude rhabdo mike - 89 f afib- presents found unresponsive on ground in room with hypoxia and cyanosis- now ox3 ncat gcs 15- unsure of what happened co diff breathing - dec bs bl - pocus with rv dilation and bl pl effsuoin w veronica -and ivc nonplethoric - more likely infectious, pe possible w recent covid but pt is anticoag, if inr subtherapeutic and head ct neg for blood will heparinize -- empiric abx to cover pna- ekg nonischemic but pocus with signif As MAY BE ETIOLOGY OF SYNCOPE KEEP ON TELE now on nrb pt goals of care noted - DNR in paperwork - will need admission - send cpk to exclude rhabdo

## 2022-11-28 NOTE — H&P ADULT - NSHPLABSRESULTS_GEN_ALL_CORE
13.1   7.16  )-----------( 224      ( 28 Nov 2022 10:55 )             42.9       11-28    141  |  109<H>  |  56<H>  ----------------------------<  90  5.2   |  20<L>  |  1.55<H>    Ca    9.1      28 Nov 2022 13:58    TPro  6.4  /  Alb  3.3  /  TBili  1.0  /  DBili  x   /  AST  62<H>  /  ALT  37  /  AlkPhos  111  11-28                  PT/INR - ( 28 Nov 2022 10:55 )   PT: 51.0 sec;   INR: 4.37 ratio         PTT - ( 28 Nov 2022 10:55 )  PTT:38.2 sec    Lactate Trend      CARDIAC MARKERS ( 28 Nov 2022 13:58 )  x     / x     / 143 U/L / x     / x      CARDIAC MARKERS ( 28 Nov 2022 10:55 )  x     / x     / 148 U/L / x     / x            CAPILLARY BLOOD GLUCOSE      POCT Blood Glucose.: 95 mg/dL (28 Nov 2022 10:17)    ACC: [id number] [facility]  [facility]                          PROCEDURE DATE:  11/28/2022          INTERPRETATION:  CLINICAL INFORMATION: Hypoxic with cyanosis. Difficulty   breathing. Evaluate for pulmonary embolism.    COMPARISON: None.    CONTRAST/COMPLICATIONS:  IV Contrast: Omnipaque 350  90 cc administered   10 cc discarded  Oral Contrast: NONE  Complications: None reported at time of study completion    PROCEDURE:  CT Angiography of the Chest.  Sagittal and coronal reformats were performed as well as 3D (MIP)   reconstructions.    FINDINGS:    LUNGS, AIRWAYS AND PLEURA: There is a large right and small left pleural   effusions with near complete right lower lobe collapse as well as partial   right middle lobe atelectasis. The central airways are patent. A tubular   opacity in the lingula may be secondary to bronchial impaction.    MEDIASTINUM AND LEO: No lymphadenopathy.    HEART/VESSELS: No pulmonary embolism. Right atrial and right ventricular   enlargement. Left chest wall dual chamber pacemaker with single right   atrial and 2 right ventricular leads present. No pericardial effusion.   Coronary arterial, mitral annular and aortic valvular calcification is   present.    VISUALIZED UPPER ABDOMEN: Reflux of contrast into the IVC/hepatic veins.    BONES/SOFT TISSUES: Degenerative changes.    IMPRESSION:  No pulmonary embolism.    Large right and small left pleural effusions with near complete right   lower lobe collapse.    Enlarged right heart with reflux of contrast into the IVC /hepatic veins   suggest elevated right heart pressures. Dual chamber pacemaker.

## 2022-11-28 NOTE — H&P ADULT - PROBLEM SELECTOR PLAN 2
Patient with a large right plural effusion  will continue to monitor  Family does not wasn't any intervention at this time

## 2022-11-28 NOTE — H&P ADULT - NSHPREVIEWOFSYSTEMS_GEN_ALL_CORE
REVIEW OF SYSTEMS:  CONSTITUTIONAL: No fever, change in weight, or fatigue  HEAD: No headache, dizziness or recent trauma  EYES: No eye pain, visual disturbances, or discharge  ENT:  No difficulty hearing, tinnitus, vertigo, No sinus or throat pain  NECK: No pain or stiffness  BREASTS: No pain, masses, or nipple discharge  RESPIRATORY: shortness of breath     CARDIOVASCULAR: No chest pain, palpitations, dizziness, CHF, arrhythmia, cardiomegaly or leg swelling  GASTROINTESTINAL: No abdominal or epigastric pain. No nausea, vomiting, or hematemesis, No diarrhea or constipation. No melena or hematochezia.  GENITOURINARY: No dysuria, frequency, hematuria, or incontinence  SKIN: No itching, burning, rashes, or lesions   LYMPH NODES: No history of enlarged glands  ENDOCRINE: No heat or cold intolerance, No hair loss. No osteoporosis or thyroid disease  MUSCULOSKELETAL: No joint pain or swelling, No muscle, back, or extremity pain  PSYCHIATRIC: No depression, anxiety, mood swings, or difficulty sleeping  HEME/LYMPH: No easy bruising, anticoagulants, bleeding disorder or bleeding gums  ALLERGY AND IMMUNOLOGIC: No hives or eczema  NEUROLOGICAL: + memory loss and  loss of strength,

## 2022-11-29 NOTE — PROVIDER CONTACT NOTE (OTHER) - ASSESSMENT
No respirations noted. Absent apical pulse.
Pt AO4. Pt on 4L NC. No s/s of discomfort. No s/s of pain.

## 2022-11-29 NOTE — CONSULT NOTE ADULT - ASSESSMENT
89 year old female with history of aortic stenosis, A. fib on Coumadin, CHF status post pacemaker, hypertension, hyperlipidemia brought in by EMS after being found down at nursing home. She was found to have large R effusion likely cause of her hypoxic respiratory failure. Geriatrics and Palliative Medicine Team is consulted for assistance with next steps as family desires comfort measures

## 2022-11-29 NOTE — CONSULT NOTE ADULT - PROBLEM SELECTOR RECOMMENDATION 3
- HCP established to be dtr in law Winsome  - Code status DNR and DNI with comfort measures  - GOC: comfort measures

## 2022-11-29 NOTE — CONSULT NOTE ADULT - PROBLEM SELECTOR RECOMMENDATION 4
- Support provided the family throughout the encounter, chaplaincy offered for support with family politely declining.  SW contacted to provide additional support to the granddtr in setting of pt's passing    An MD Ratna  [facility] Team Consults  Please call if we can be of assistance, [phone]

## 2022-11-29 NOTE — PROGRESS NOTE ADULT - SUBJECTIVE AND OBJECTIVE BOX
89 year old female with history of aortic stenosis, A. fib on Coumadin, CHF status post pacemaker, hypertension, hyperlipidemia brought in by EMS after being found down at nursing home.  Was found to be hypoxic in the field with cyanosis around her lips and fingertips, placed on 15 L nonrebreather mask with some improvement.  Patient here complains of difficulty breathing, but otherwise difficult to obtain history at this time due to critical illness. Patient was brought to [facility] for further evaluation and treatment. Patient seen in the ED responding to questions and commands currently on Bipap with improvement in hypoxia. Patient was made DNR/DNI by her family. Comfort measures were initiated. Patient  this am.     MEDICATIONS  (STANDING):  sodium chloride 0.9%. 1000 milliLiter(s) (50 mL/Hr) IV Continuous <Continuous>    MEDICATIONS  (PRN):  HYDROmorphone  Injectable 0.2 milliGRAM(s) IV Push every 2 hours PRN respiratory distress  HYDROmorphone  Injectable 0.2 milliGRAM(s) IV Push every 2 hours PRN moderate, severe pain          VITALS:   T(C): 36.4 (22 @ 04:18), Max: 36.8 (22 @ 21:23)  HR: 59 (22 @ 04:18) (59 - 89)  BP: 80/50 (22 @ 04:18) (74/40 - 100/60)  RR: 18 (22 @ 04:18) (16 - 18)  SpO2: 96% (22 @ 04:18) (93% - 100%)  Wt(kg): --        LABS:    CARDIAC MARKERS ( 2022 13:58 )  x     / x     / 143 U/L / x     / x      CARDIAC MARKERS ( 2022 10:55 )  x     / x     / 148 U/L / x     / x          CBC Full  -  ( 2022 10:55 )  WBC Count : 7.16 K/uL  RBC Count : 4.05 M/uL  Hemoglobin : 13.1 g/dL  Hematocrit : 42.9 %  Platelet Count - Automated : 224 K/uL  Mean Cell Volume : 105.9 fl  Mean Cell Hemoglobin : 32.3 pg  Mean Cell Hemoglobin Concentration : 30.5 gm/dL  Auto Neutrophil # : 4.15 K/uL  Auto Lymphocyte # : 1.57 K/uL  Auto Monocyte # : 0.57 K/uL  Auto Eosinophil # : 0.25 K/uL  Auto Basophil # : 0.00 K/uL  Auto Neutrophil % : 57.9 %  Auto Lymphocyte % : 21.9 %  Auto Monocyte % : 7.9 %  Auto Eosinophil % : 3.5 %  Auto Basophil % : 0.0 %        141  |  109<H>  |  56<H>  ----------------------------<  90  5.2   |  20<L>  |  1.55<H>    Ca    9.1      2022 13:58    TPro  6.4  /  Alb  3.3  /  TBili  1.0  /  DBili  x   /  AST  62<H>  /  ALT  37  /  AlkPhos  111      LIVER FUNCTIONS - ( 2022 13:58 )  Alb: 3.3 g/dL / Pro: 6.4 g/dL / ALK PHOS: 111 U/L / ALT: 37 U/L / AST: 62 U/L / GGT: x           PT/INR - ( 2022 10:55 )   PT: 51.0 sec;   INR: 4.37 ratio         PTT - ( 2022 10:55 )  PTT:38.2 sec    CAPILLARY BLOOD GLUCOSE          RADIOLOGY & ADDITIONAL TESTS:

## 2022-11-29 NOTE — CONSULT NOTE ADULT - CONVERSATION DETAILS
Upon initial encounter, pt was awake and alert, conversant, requesting for food. Team reviewed pt's HCP document in the bedside chart and contacted pt's dtr in law/HCP Winsome to discuss next steps. Initially seeing pt's improved clinical status, the thought was to consider returning pt to her assisted living with hospice care. Hospice services discussed in detail, though Winsome was hesitant to bring pt back to the assisted living for concerns about their quality of care.     As pt developed respiratory distress after suspected aspiration event, dtr confirmed the goal is for comfort measures only. We discussed PCU transfer for comfort measures, management of symptoms, and potentially transferring to an inpatient hospice unit for further care. However, pt rapidly deteriorated to agonal breathing during our encounter and ultimately passed away. Emotionally support offered to the granddtr who was at the bedside. Chaplaincy support offered but was politely declined by the family.

## 2022-11-29 NOTE — PROVIDER CONTACT NOTE (CHANGE IN STATUS NOTIFICATION) - SITUATION
Change in respiratory status. Accessory muscle involvement. Patient not responding to external stimuli. Unable to obtain b/p or obtain o2 sat. Palliative care team at bedside.

## 2022-11-29 NOTE — PROVIDER CONTACT NOTE (OTHER) - BACKGROUND
pt admitted because she fell at her nursing home. pt was cyanotic around her lips and SOB. Pt was put on bipap and 5L NC.

## 2022-11-29 NOTE — CONSULT NOTE ADULT - PROBLEM SELECTOR RECOMMENDATION 9
In setting of large R effusion, aortic stenosis, likely CHF congestion  - s/p bipap with transition to nasal cannula in ER  - IV dilaudid 0.2 mg prn dyspnea

## 2022-11-29 NOTE — DISCHARGE NOTE FOR THE EXPIRED PATIENT - HOSPITAL COURSE
89 yo woman presents from a nursing home with hypoxia. Patient symptoms improved with Bipap  Patient found to have a large right plural effusion     ·  Problem: Hypoxia.   ·  Plan:  O2 via NC  probably due to the large plural effusion on the right  conservative management as per family.    ·  Problem: Pleural effusion, right.   ·  Plan: Patient with a large right plural effusion  Family does not wasn't any intervention at this time.    ·  Problem: HTN (hypertension).   ·  Plan: Patient is currently hypotensive  Held nifedipine and metoprolol.    ·  Problem: Afib.   ·  Plan: continue to monitor.  Patient  with family at bedside

## 2022-11-29 NOTE — PROVIDER CONTACT NOTE (OTHER) - ACTION/TREATMENT ORDERED:
PA aware.
Called WILLIE at  Spoke with Tatiana Ambrose Ref # [id number].     As per Tatiana, patient is not a candidate for organ donation.

## 2022-11-29 NOTE — CONSULT NOTE ADULT - SUBJECTIVE AND OBJECTIVE BOX
HPI:  89 year old female with history of aortic stenosis, A. fib on Coumadin, CHF status post pacemaker, hypertension, hyperlipidemia brought in by EMS after being found down at nursing home.  Was found to be hypoxic in the field with cyanosis around her lips and fingertips, placed on 15 L nonrebreather mask with some improvement.  Patient here complains of difficulty breathing, but otherwise difficult to obtain history at this time due to critical illness. Patient was brought to [facility] for further evaluation and treatment. Patient seen in the ED responding to questions and commands currently on Bipap with improvement in hypoxia (28 Nov 2022 14:51)    Geriatrics and Palliative Medicine Team is consulted for assistance of next steps given family desires for comfort measures. Pt seen this morning in the ER with granddtr at bedside. Pt was initially awake, alert and conversant. She requested to eat and ate very quickly after getting her breakfast tray. Pt coughed as she was eating and likely had an aspiration event. She became tachypneic with increased work of breathing, she received a dose of IV dilaudid for dyspnea and passed away shortly after. Geriatrics and Palliative Medicine Team was on the phone with pt's HCP/dtr in law Schumacher throughout the entire encounter. Emotional support was provided to granddtr and dtr on the phone. Please see GOC section below for discussion details with Winsome.     PERTINENT PM/SXH:   H/O hyperlipidemia    H/O: hypertension    Atrial fibrillation    Hearing loss    Atrial fibrillation    History of complete AV block    H/O CHF    Murmur    Aortic stenosis      Artificial pacemaker    S/P ovarian cystectomy      FAMILY HISTORY:    Family Hx substance abuse [ ]yes [ x]no  ITEMS NOT CHECKED ARE NOT PRESENT    SOCIAL HISTORY:   Significant other/partner[ ]  Children[ x]  Lutheran/Spirituality:  Substance hx:  [ ]   Tobacco hx:  [ ]   Alcohol hx: [ ]   Home Opioid hx:  [ ] I-Stop Reference No:  Living Situation: [ ]Home  [ x]Long term care  [ ]Rehab [ ]Other    ADVANCE DIRECTIVES:    DNR/MOLST  [ ]  Living Will  [ ]   DECISION MAKER(s):  [ x] Health Care Proxy(s)  [ ] Surrogate(s)  [ ] Guardian           Name(s): Phone Number(s): dtr in law Schumacher    BASELINE (I)ADL(s) (prior to admission):  Branch: [ ]Total  [x ] Moderate [ ]Dependent    Allergies    No Known Allergies    Intolerances    MEDICATIONS  (STANDING):  sodium chloride 0.9%. 1000 milliLiter(s) (50 mL/Hr) IV Continuous <Continuous>    MEDICATIONS  (PRN):  HYDROmorphone  Injectable 0.2 milliGRAM(s) IV Push every 2 hours PRN respiratory distress  HYDROmorphone  Injectable 0.2 milliGRAM(s) IV Push every 2 hours PRN moderate, severe pain    PRESENT SYMPTOMS: [ ]Unable to self-report  [ ] CPOT [ ] PAINADs [ ] RDOS  Source if other than patient:  [ ]Family   [ ]Team     Pain: [ ]yes [ x]no  QOL impact -   Location -                    Aggravating factors -  Quality -  Radiation -  Timing-  Severity (0-10 scale):  Minimal acceptable level (0-10 scale):     Dyspnea:                           [x ]Mild [ ]Moderate [ ]Severe  Anxiety:                             [ ]Mild [ ]Moderate [ ]Severe  Fatigue:                             [x ]Mild [ ]Moderate [ ]Severe  Nausea:                             [ ]Mild [ ]Moderate [ ]Severe  Loss of appetite:              [ ]Mild [ ]Moderate [ ]Severe  Constipation:                    [ ]Mild [ ]Moderate [ ]Severe    PCSSQ[Palliative Care Spiritual Screening Question]   Severity (0-10):  Score of 4 or > indicate consideration of Chaplaincy referral.  Chaplaincy Referral: [ ] yes [x ] refused [ ] following [ ] Deferred     Caregiver Ahmeek? : [ ] yes [ x] no [ ] Deferred [ ] Declined             Social work referral [ ] Patient & Family Centered Care Referral [ ]     Anticipatory Grief present?:  [x ] yes [ ] no  [ ] Deferred                  Social work referral [ ] Chaplaincy Referral[ ]      Other Symptoms:  [x ]All other review of systems negative     Palliative Performance Status Version 2:    40     %    http://npcrc.org/files/news/palliative_performance_scale_ppsv2.pdf    PHYSICAL EXAM:  Vital Signs Last 24 Hrs  T(C): 36.4 (29 Nov 2022 04:18), Max: 36.8 (28 Nov 2022 21:23)  T(F): 97.5 (29 Nov 2022 04:18), Max: 98.3 (28 Nov 2022 21:23)  HR: 59 (29 Nov 2022 04:18) (59 - 89)  BP: 80/50 (29 Nov 2022 04:18) (74/40 - 100/60)  BP(mean): 49 (28 Nov 2022 16:45) (49 - 69)  RR: 18 (29 Nov 2022 04:18) (16 - 18)  SpO2: 96% (29 Nov 2022 04:18) (93% - 100%)    Parameters below as of 29 Nov 2022 04:18  Patient On (Oxygen Delivery Method): nasal cannula  O2 Flow (L/min): 4   I&O's Summary    GENERAL: [ ]Cachexia    [x ]Alert  [ x]Oriented x2   [ ]Lethargic  [ ]Unarousable  [x ]Verbal  [ ]Non-Verbal  Behavioral:   [ ] Anxiety  [ ] Delirium [ ] Agitation [ ] Other  HEENT:  [x ]Normal   [ ]Dry mouth   [ ]ET Tube/Trach  [ ]Oral lesions  PULMONARY:   [x ]Clear [ ]Tachypnea  [x ]Audible excessive secretions   [ ]Rhonchi        [ ]Right [ ]Left [ ]Bilateral  [ ]Crackles        [ ]Right [ ]Left [ ]Bilateral  [ ]Wheezing     [ ]Right [ ]Left [ ]Bilateral  [ ]Diminished breath sounds [ ]right [ ]left [ ]bilateral  CARDIOVASCULAR:    [x ]Regular [ ]Irregular [ ]Tachy  [ ]Polo [ ]Murmur [ ]Other  GASTROINTESTINAL:  [x ]Soft  [ ]Distended   [x ]+BS  [x ]Non tender [ ]Tender  [ ]Other [ ]PEG [ ]OGT/ NGT  Last BM:  GENITOURINARY:  [ ]Normal [ x] Incontinent   [ ]Oliguria/Anuria   [ ]Andre  MUSCULOSKELETAL:   [ ]Normal   [ x]Weakness  [ ]Bed/Wheelchair bound [ ]Edema  NEUROLOGIC:   [ ]No focal deficits  [ x]Cognitive impairment  [ ]Dysphagia [ ]Dysarthria [ ]Paresis [ ]Other   SKIN:   [ ]Normal  [ ]Rash  [ ]Other  [ ]Pressure ulcer(s)       Present on admission [ ]y [ ]n    CRITICAL CARE:  [ ] Shock Present  [ ]Septic [ ]Cardiogenic [ ]Neurologic [ ]Hypovolemic  [ ]  Vasopressors [ ]  Inotropes   [ ]Respiratory failure present [ ]Mechanical ventilation [ ]Non-invasive ventilatory support [ ]High flow    [ ]Acute  [ ]Chronic [ ]Hypoxic  [ ]Hypercarbic [ ]Other  [ ]Other organ failure     LABS:                        13.1   7.16  )-----------( 224      ( 28 Nov 2022 10:55 )             42.9   11-28    141  |  109<H>  |  56<H>  ----------------------------<  90  5.2   |  20<L>  |  1.55<H>    Ca    9.1      28 Nov 2022 13:58    TPro  6.4  /  Alb  3.3  /  TBili  1.0  /  DBili  x   /  AST  62<H>  /  ALT  37  /  AlkPhos  111  11-28  PT/INR - ( 28 Nov 2022 10:55 )   PT: 51.0 sec;   INR: 4.37 ratio         PTT - ( 28 Nov 2022 10:55 )  PTT:38.2 sec      RADIOLOGY & ADDITIONAL STUDIES:    < from: CT Angio Chest PE Protocol w/ IV Cont (11.28.22 @ 10:56) >    IMPRESSION:  No pulmonary embolism.    Large right and small left pleural effusions with near complete right   lower lobe collapse.    Enlarged right heart with reflux of contrast into the IVC /hepatic veins   suggest elevated right heart pressures. Dual chamber pacemaker.    --- End of Report ---    < end of copied text >  < from: CT Head No Cont (11.28.22 @ 10:53) >  IMPRESSION:    1.  CT HEAD:    No acute intracranial injury.   Right globe deformity has   progressed since injury 1/1/2022. Ischemic white matter disease and   atrophy not atypical for age    2.  CT CERVICAL:   No cervical vertebral fracture.    Reversal of the   normal cervical lordosis suggestive of disc pathology and / or muscle   spasm. Moderate to severe widespread cervical degenerative disc disease.    3. Large right pleural effusion.The presence of a cardiac pacemaker may   represent contraindication to MR scanning.    --- End of Report ---    < end of copied text >      PROTEIN CALORIE MALNUTRITION PRESENT: [ ]mild [ ]moderate [ ]severe [ ]underweight [ ]morbid obesity  https://www.andeal.org/vault/2440/web/files/ONC/Table_Clinical%20Characteristics%20to%20Document%20Malnutrition-White%20JV%20et%20al%202012.pdf    Height (cm): 149.9 (11-28-22 @ 10:04), 149.9 (09-30-22 @ 18:47), 154.9 (01-01-22 @ 15:36)  Weight (kg): 49.9 (11-28-22 @ 10:04), 45 (09-30-22 @ 18:47)  BMI (kg/m2): 22.2 (11-28-22 @ 10:04), 20 (09-30-22 @ 18:47)    [ ]PPSV2 < or = to 30% [ ]significant weight loss  [ ]poor nutritional intake  [ ]anasarca[ ]Artificial Nutrition      Other REFERRALS:  [ ]Hospice  [ ]Child Life  [ ]Social Work  [ ]Case management [ ]Holistic Therapy

## 2022-11-29 NOTE — PROVIDER CONTACT NOTE (CHANGE IN STATUS NOTIFICATION) - ASSESSMENT
Accessory muscle involvement with respirations, unable to respond to external stimuli. Unable to obtain b/p or obtain o2 sat. Palliative care team at bedside.

## 2022-12-03 LAB
CULTURE RESULTS: SIGNIFICANT CHANGE UP
CULTURE RESULTS: SIGNIFICANT CHANGE UP
SPECIMEN SOURCE: SIGNIFICANT CHANGE UP
SPECIMEN SOURCE: SIGNIFICANT CHANGE UP

## 2022-12-13 ENCOUNTER — APPOINTMENT (OUTPATIENT)
Dept: ELECTROPHYSIOLOGY | Facility: CLINIC | Age: 87
End: 2022-12-13

## 2023-01-18 ENCOUNTER — OFFICE (OUTPATIENT)
Dept: URBAN - METROPOLITAN AREA CLINIC 90 | Facility: CLINIC | Age: 88
Setting detail: OPHTHALMOLOGY
End: 2023-01-18
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
## 2023-01-18 PROCEDURE — NO SHOW FE NO SHOW FEE: Performed by: OPHTHALMOLOGY

## 2023-03-10 ENCOUNTER — APPOINTMENT (OUTPATIENT)
Dept: ELECTROPHYSIOLOGY | Facility: CLINIC | Age: 88
End: 2023-03-10

## 2024-05-24 NOTE — DISCHARGE NOTE NURSING/CASE MANAGEMENT/SOCIAL WORK - NSDCPEPTCOWAR_GEN_ALL_CORE
Right arm immobilizers applied. Warfarin/Coumadin - Compliance/Warfarin/Coumadin - Dietary Advice/Warfarin/Coumadin - Follow up monitoring/Warfarin/Coumadin - Potential for adverse drug reactions and interactions

## 2025-03-13 NOTE — ED ADULT TRIAGE NOTE - ESI TRIAGE ACUITY LEVEL, MLM
Problem: Wound:  Goal: Will show signs of wound healing; wound closure and no evidence of infection  Description: Will show signs of wound healing; wound closure and no evidence of infection  Outcome: Progressing     Problem: Pain  Goal: Verbalizes/displays adequate comfort level or baseline comfort level  Outcome: Progressing      3